# Patient Record
Sex: FEMALE | Race: WHITE | NOT HISPANIC OR LATINO
[De-identification: names, ages, dates, MRNs, and addresses within clinical notes are randomized per-mention and may not be internally consistent; named-entity substitution may affect disease eponyms.]

---

## 2024-02-16 PROBLEM — Z00.00 ENCOUNTER FOR PREVENTIVE HEALTH EXAMINATION: Status: ACTIVE | Noted: 2024-02-16

## 2024-02-20 ENCOUNTER — NON-APPOINTMENT (OUTPATIENT)
Age: 34
End: 2024-02-20

## 2024-02-21 ENCOUNTER — APPOINTMENT (OUTPATIENT)
Dept: OTOLARYNGOLOGY | Facility: CLINIC | Age: 34
End: 2024-02-21
Payer: COMMERCIAL

## 2024-02-21 VITALS
HEART RATE: 78 BPM | DIASTOLIC BLOOD PRESSURE: 80 MMHG | OXYGEN SATURATION: 100 % | WEIGHT: 160 LBS | HEIGHT: 68 IN | SYSTOLIC BLOOD PRESSURE: 116 MMHG | BODY MASS INDEX: 24.25 KG/M2

## 2024-02-21 DIAGNOSIS — Z87.891 PERSONAL HISTORY OF NICOTINE DEPENDENCE: ICD-10-CM

## 2024-02-21 DIAGNOSIS — Z82.49 FAMILY HISTORY OF ISCHEMIC HEART DISEASE AND OTHER DISEASES OF THE CIRCULATORY SYSTEM: ICD-10-CM

## 2024-02-21 DIAGNOSIS — E04.1 NONTOXIC SINGLE THYROID NODULE: ICD-10-CM

## 2024-02-21 DIAGNOSIS — Z80.3 FAMILY HISTORY OF MALIGNANT NEOPLASM OF BREAST: ICD-10-CM

## 2024-02-21 DIAGNOSIS — Z78.9 OTHER SPECIFIED HEALTH STATUS: ICD-10-CM
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
## 2024-02-21 PROCEDURE — 99204 OFFICE O/P NEW MOD 45 MIN: CPT

## 2024-02-21 NOTE — REASON FOR VISIT
[Initial Consultation] : an initial consultation for [Parent] : parent [FreeTextEntry2] :  thyroid nodules

## 2024-02-21 NOTE — PHYSICAL EXAM
[FreeTextEntry1] : there is a rubbery firm L nodule consuming most L lobe.  no plap nodes [Midline] : trachea located in midline position [Normal] : no rashes

## 2024-02-21 NOTE — CONSULT LETTER
[Dear  ___] : Dear  [unfilled], [Consult Letter:] : I had the pleasure of evaluating your patient, [unfilled]. [Please see my note below.] : Please see my note below. [Consult Closing:] : Thank you very much for allowing me to participate in the care of this patient.  If you have any questions, please do not hesitate to contact me. [Sincerely,] : Sincerely, [FreeTextEntry2] : Vipin Romero MD ([place], [place]) [FreeTextEntry3] : Sanju Mena MD, FACS  [facility] Associate Chair Department of Otolaryngology Professor Otolaryngology & Molecular Medicine [facility]

## 2024-02-21 NOTE — HISTORY OF PRESENT ILLNESS
[de-identified] : 33 year old woman referred by Dr. Vipin Romero for thyroid nodules that were first noticed in the beginning of 2/2024. Thyroid Sonography done 2/9/2024 and FNA was just done today done at [facility]. Currently has some swelling and throat pain from the biopsy. Patient was asymptomatic before biopsy. Patient denies globus sensation, dysphagia, odynophagia, dyspnea, dysphonia, hemoptysis or otalgia.  Denies recent fevers/infections, chills, night sweats, weight loss.  Denies use of over the counter antacids or reflux medications.  Former smoker, quit at age 28, smoked 1 ppd for about 4 years. Social alcohol consumption. No family history of thyroid disease or thyroid cancer.  States she has never been on thyroid medication.  no swaloow or resp issues.  pt feels neck/thyroid arae may have been swolloen for a year.

## 2024-02-23 ENCOUNTER — OUTPATIENT (OUTPATIENT)
Dept: OUTPATIENT SERVICES | Facility: HOSPITAL | Age: 34
LOS: 1 days | End: 2024-02-23

## 2024-02-23 ENCOUNTER — APPOINTMENT (OUTPATIENT)
Dept: ULTRASOUND IMAGING | Facility: CLINIC | Age: 34
End: 2024-02-23
Payer: COMMERCIAL

## 2024-02-23 PROCEDURE — 76536 US EXAM OF HEAD AND NECK: CPT | Mod: 26

## 2024-03-04 ENCOUNTER — RESULT REVIEW (OUTPATIENT)
Age: 34
End: 2024-03-04

## 2024-03-04 ENCOUNTER — NON-APPOINTMENT (OUTPATIENT)
Age: 34
End: 2024-03-04

## 2024-03-05 ENCOUNTER — OUTPATIENT (OUTPATIENT)
Dept: OUTPATIENT SERVICES | Facility: HOSPITAL | Age: 34
LOS: 1 days | End: 2024-03-05
Payer: COMMERCIAL

## 2024-03-05 ENCOUNTER — APPOINTMENT (OUTPATIENT)
Dept: ULTRASOUND IMAGING | Facility: IMAGING CENTER | Age: 34
End: 2024-03-05

## 2024-03-05 ENCOUNTER — APPOINTMENT (OUTPATIENT)
Dept: MRI IMAGING | Facility: IMAGING CENTER | Age: 34
End: 2024-03-05
Payer: COMMERCIAL

## 2024-03-05 ENCOUNTER — TRANSCRIPTION ENCOUNTER (OUTPATIENT)
Age: 34
End: 2024-03-05

## 2024-03-05 DIAGNOSIS — C73 MALIGNANT NEOPLASM OF THYROID GLAND: ICD-10-CM

## 2024-03-05 PROCEDURE — 70543 MRI ORBT/FAC/NCK W/O &W/DYE: CPT | Mod: 26

## 2024-03-05 PROCEDURE — 76536 US EXAM OF HEAD AND NECK: CPT

## 2024-03-05 PROCEDURE — 70543 MRI ORBT/FAC/NCK W/O &W/DYE: CPT

## 2024-03-05 PROCEDURE — 76536 US EXAM OF HEAD AND NECK: CPT | Mod: 26

## 2024-03-06 ENCOUNTER — RESULT REVIEW (OUTPATIENT)
Age: 34
End: 2024-03-06

## 2024-03-07 ENCOUNTER — OUTPATIENT (OUTPATIENT)
Dept: OUTPATIENT SERVICES | Facility: HOSPITAL | Age: 34
LOS: 1 days | End: 2024-03-07
Payer: COMMERCIAL

## 2024-03-07 ENCOUNTER — LABORATORY RESULT (OUTPATIENT)
Age: 34
End: 2024-03-07

## 2024-03-07 ENCOUNTER — APPOINTMENT (OUTPATIENT)
Dept: ULTRASOUND IMAGING | Facility: IMAGING CENTER | Age: 34
End: 2024-03-07
Payer: COMMERCIAL

## 2024-03-07 ENCOUNTER — RESULT REVIEW (OUTPATIENT)
Age: 34
End: 2024-03-07

## 2024-03-07 DIAGNOSIS — C73 MALIGNANT NEOPLASM OF THYROID GLAND: ICD-10-CM

## 2024-03-07 PROCEDURE — 88173 CYTOPATH EVAL FNA REPORT: CPT | Mod: 26

## 2024-03-07 PROCEDURE — 88305 TISSUE EXAM BY PATHOLOGIST: CPT | Mod: 26

## 2024-03-07 PROCEDURE — 10005 FNA BX W/US GDN 1ST LES: CPT

## 2024-03-07 PROCEDURE — 88172 CYTP DX EVAL FNA 1ST EA SITE: CPT

## 2024-03-07 PROCEDURE — 88305 TISSUE EXAM BY PATHOLOGIST: CPT

## 2024-03-07 PROCEDURE — 88173 CYTOPATH EVAL FNA REPORT: CPT

## 2024-03-08 LAB
T3FREE SERPL-MCNC: 3.54 PG/ML
T4 FREE SERPL-MCNC: 1.4 NG/DL
THYROGLOB AB SERPL-ACNC: <20 IU/ML
THYROGLOB SERPL-MCNC: 913 NG/ML
TSH SERPL-ACNC: 1.71 UIU/ML

## 2024-03-12 ENCOUNTER — OUTPATIENT (OUTPATIENT)
Dept: OUTPATIENT SERVICES | Facility: HOSPITAL | Age: 34
LOS: 1 days | End: 2024-03-12

## 2024-03-12 VITALS
HEIGHT: 68 IN | SYSTOLIC BLOOD PRESSURE: 126 MMHG | DIASTOLIC BLOOD PRESSURE: 86 MMHG | OXYGEN SATURATION: 99 % | TEMPERATURE: 98 F | HEART RATE: 62 BPM | RESPIRATION RATE: 18 BRPM | WEIGHT: 184.09 LBS

## 2024-03-12 DIAGNOSIS — S61.219A LACERATION WITHOUT FOREIGN BODY OF UNSPECIFIED FINGER WITHOUT DAMAGE TO NAIL, INITIAL ENCOUNTER: Chronic | ICD-10-CM

## 2024-03-12 DIAGNOSIS — E04.1 NONTOXIC SINGLE THYROID NODULE: ICD-10-CM

## 2024-03-12 LAB — HCG UR QL: NEGATIVE — SIGNIFICANT CHANGE UP

## 2024-03-12 NOTE — H&P PST ADULT - NSICDXFAMILYHX_GEN_ALL_CORE_FT
FAMILY HISTORY:  Father  Still living? Unknown  FH: myocardial infarction, Age at diagnosis: Age Unknown    Grandparent  Still living? Unknown  FH: breast cancer, Age at diagnosis: Age Unknown

## 2024-03-12 NOTE — H&P PST ADULT - CIGARETTES, PACKS/DAY
Medical Necessity Information: It is in your best interest to select a reason for this procedure from the list below. All of these items fulfill various CMS LCD requirements except lesion extends to a margin. 0.5

## 2024-03-12 NOTE — H&P PST ADULT - PROBLEM SELECTOR PLAN 1
Patient tentatively scheduled for Left Thyroid Lobectomy on 3/18/24.  Pre-op instructions provided. Pt given verbal and written instructions with teach back on chlorhexidine shampoo and pepcid. Pt verbalized understanding with return demonstration.     [facility] was done at [facility].  Copy of recent labs CBC, BMP requested from PCP.

## 2024-03-12 NOTE — H&P PST ADULT - NEGATIVE ENMT SYMPTOMS
Denies dentures. Denies loose teeth./no hearing difficulty/no ear pain/no tinnitus/no vertigo/no sinus symptoms/no abnormal taste sensation/no dry mouth/no throat pain/no dysphagia

## 2024-03-12 NOTE — H&P PST ADULT - NEGATIVE NEUROLOGICAL SYMPTOMS
no transient paralysis/no weakness/no paresthesias/no generalized seizures/no focal seizures/no syncope/no vertigo/no loss of sensation/no difficulty walking/no headache/no confusion/no facial palsy

## 2024-03-12 NOTE — H&P PST ADULT - NSICDXPASTMEDICALHX_GEN_ALL_CORE_FT
PAST MEDICAL HISTORY:  Nontoxic single thyroid nodule     Seasonal allergies      PAST MEDICAL HISTORY:  Anxiety and depression     Nontoxic single thyroid nodule     Seasonal allergies

## 2024-03-12 NOTE — H&P PST ADULT - HISTORY OF PRESENT ILLNESS
33 year old female with pmhx of Seasonal allergies, presents for pre-op evaluation for diagnosis of Nontoxic single thyroid nodule. Patient is scheduled for Left Thyroid Lobectomy. Patient was found to have Left thyroid nodule 2/2024, s/p biopsy which showed Malignant Papillary Thyroid Carcinoma. Denies throat pain, hoarseness, dysphagia, fever, sob, cp, peck, n/v/d/c.

## 2024-03-14 LAB — NON-GYNECOLOGICAL CYTOLOGY STUDY: SIGNIFICANT CHANGE UP

## 2024-03-15 PROBLEM — F41.9 ANXIETY DISORDER, UNSPECIFIED: Chronic | Status: ACTIVE | Noted: 2024-03-12

## 2024-03-15 NOTE — ASU PATIENT PROFILE, ADULT - FALL HARM RISK - UNIVERSAL INTERVENTIONS
Bed in lowest position, wheels locked, appropriate side rails in place/Call bell, personal items and telephone in reach/Instruct patient to call for assistance before getting out of bed or chair/Non-slip footwear when patient is out of bed/[place] to call system/Physically safe environment - no spills, clutter or unnecessary equipment/Purposeful Proactive Rounding/Room/bathroom lighting operational, light cord in reach

## 2024-03-15 NOTE — ASU PATIENT PROFILE, ADULT - NSICDXPASTMEDICALHX_GEN_ALL_CORE_FT
PAST MEDICAL HISTORY:  Anxiety and depression     Nontoxic single thyroid nodule     Seasonal allergies

## 2024-03-16 PROBLEM — J30.2 OTHER SEASONAL ALLERGIC RHINITIS: Chronic | Status: ACTIVE | Noted: 2024-03-12

## 2024-03-16 PROBLEM — E04.1 NONTOXIC SINGLE THYROID NODULE: Chronic | Status: ACTIVE | Noted: 2024-03-12

## 2024-03-17 ENCOUNTER — TRANSCRIPTION ENCOUNTER (OUTPATIENT)
Age: 34
End: 2024-03-17

## 2024-03-18 ENCOUNTER — INPATIENT (INPATIENT)
Facility: HOSPITAL | Age: 34
LOS: 1 days | Discharge: HOME | End: 2024-03-20
Attending: OTOLARYNGOLOGY | Admitting: OTOLARYNGOLOGY
Payer: COMMERCIAL

## 2024-03-18 ENCOUNTER — RESULT REVIEW (OUTPATIENT)
Age: 34
End: 2024-03-18

## 2024-03-18 ENCOUNTER — TRANSCRIPTION ENCOUNTER (OUTPATIENT)
Age: 34
End: 2024-03-18

## 2024-03-18 ENCOUNTER — APPOINTMENT (OUTPATIENT)
Dept: OTOLARYNGOLOGY | Facility: HOSPITAL | Age: 34
End: 2024-03-18

## 2024-03-18 VITALS
DIASTOLIC BLOOD PRESSURE: 85 MMHG | HEART RATE: 70 BPM | SYSTOLIC BLOOD PRESSURE: 118 MMHG | TEMPERATURE: 98 F | WEIGHT: 184.09 LBS | HEIGHT: 68 IN | RESPIRATION RATE: 18 BRPM | OXYGEN SATURATION: 98 %

## 2024-03-18 DIAGNOSIS — S61.219A LACERATION WITHOUT FOREIGN BODY OF UNSPECIFIED FINGER WITHOUT DAMAGE TO NAIL, INITIAL ENCOUNTER: Chronic | ICD-10-CM

## 2024-03-18 DIAGNOSIS — C73 MALIGNANT NEOPLASM OF THYROID GLAND: ICD-10-CM

## 2024-03-18 LAB
CALCIUM SERPL-MCNC: 8.3 MG/DL — LOW (ref 8.4–10.5)
HCG UR QL: NEGATIVE — SIGNIFICANT CHANGE UP
PTH-INTACT FLD-MCNC: 25 PG/ML — SIGNIFICANT CHANGE UP (ref 15–65)

## 2024-03-18 PROCEDURE — 88307 TISSUE EXAM BY PATHOLOGIST: CPT | Mod: 26

## 2024-03-18 PROCEDURE — 60252 REMOVAL OF THYROID: CPT | Mod: GC,RT

## 2024-03-18 PROCEDURE — 38724 REMOVAL OF LYMPH NODES NECK: CPT | Mod: GC,LT

## 2024-03-18 DEVICE — CARTRIDGE MICROCLIP 30: Type: IMPLANTABLE DEVICE | Status: FUNCTIONAL

## 2024-03-18 DEVICE — TUBE EMG NIM TRIVANTAGE 7MM: Type: IMPLANTABLE DEVICE | Status: FUNCTIONAL

## 2024-03-18 DEVICE — SURGICEL 2 X 14": Type: IMPLANTABLE DEVICE | Status: FUNCTIONAL

## 2024-03-18 DEVICE — LIGATING CLIPS WECK HORIZON SMALL-WIDE (RED) 24: Type: IMPLANTABLE DEVICE | Status: FUNCTIONAL

## 2024-03-18 DEVICE — LIGATING CLIPS WECK HORIZON MEDIUM (BLUE) 24: Type: IMPLANTABLE DEVICE | Status: FUNCTIONAL

## 2024-03-18 RX ORDER — HYDROXYZINE HCL 10 MG
1 TABLET ORAL
Refills: 0 | DISCHARGE

## 2024-03-18 RX ORDER — OXYCODONE HYDROCHLORIDE 5 MG/1
7.5 TABLET ORAL EVERY 4 HOURS
Refills: 0 | Status: DISCONTINUED | OUTPATIENT
Start: 2024-03-18 | End: 2024-03-20

## 2024-03-18 RX ORDER — FENTANYL CITRATE 50 UG/ML
50 INJECTION INTRAVENOUS
Refills: 0 | Status: DISCONTINUED | OUTPATIENT
Start: 2024-03-18 | End: 2024-03-18

## 2024-03-18 RX ORDER — CHOLECALCIFEROL (VITAMIN D3) 125 MCG
0 CAPSULE ORAL
Refills: 0 | DISCHARGE

## 2024-03-18 RX ORDER — FAMOTIDINE 10 MG/ML
20 INJECTION INTRAVENOUS DAILY
Refills: 0 | Status: DISCONTINUED | OUTPATIENT
Start: 2024-03-18 | End: 2024-03-20

## 2024-03-18 RX ORDER — BENZOCAINE AND MENTHOL 5; 1 G/100ML; G/100ML
2 LIQUID ORAL EVERY 4 HOURS
Refills: 0 | Status: DISCONTINUED | OUTPATIENT
Start: 2024-03-18 | End: 2024-03-20

## 2024-03-18 RX ORDER — LEVOTHYROXINE SODIUM 125 MCG
137 TABLET ORAL DAILY
Refills: 0 | Status: DISCONTINUED | OUTPATIENT
Start: 2024-03-19 | End: 2024-03-20

## 2024-03-18 RX ORDER — OXYCODONE HYDROCHLORIDE 5 MG/1
5 TABLET ORAL EVERY 4 HOURS
Refills: 0 | Status: DISCONTINUED | OUTPATIENT
Start: 2024-03-18 | End: 2024-03-20

## 2024-03-18 RX ORDER — CALCIUM CARBONATE 500(1250)
1 TABLET ORAL EVERY 8 HOURS
Refills: 0 | Status: DISCONTINUED | OUTPATIENT
Start: 2024-03-18 | End: 2024-03-20

## 2024-03-18 RX ORDER — FLUOXETINE HCL 10 MG
1 CAPSULE ORAL
Refills: 0 | DISCHARGE

## 2024-03-18 RX ORDER — ONDANSETRON 8 MG/1
4 TABLET, FILM COATED ORAL ONCE
Refills: 0 | Status: DISCONTINUED | OUTPATIENT
Start: 2024-03-18 | End: 2024-03-20

## 2024-03-18 RX ORDER — ACETAMINOPHEN 500 MG
650 TABLET ORAL EVERY 6 HOURS
Refills: 0 | Status: DISCONTINUED | OUTPATIENT
Start: 2024-03-18 | End: 2024-03-20

## 2024-03-18 RX ORDER — CALCITRIOL 0.5 UG/1
0.25 CAPSULE ORAL
Refills: 0 | Status: DISCONTINUED | OUTPATIENT
Start: 2024-03-18 | End: 2024-03-20

## 2024-03-18 RX ADMIN — FAMOTIDINE 20 MILLIGRAM(S): 10 INJECTION INTRAVENOUS at 23:42

## 2024-03-18 RX ADMIN — Medication 1 TABLET(S): at 23:41

## 2024-03-18 RX ADMIN — OXYCODONE HYDROCHLORIDE 7.5 MILLIGRAM(S): 5 TABLET ORAL at 22:42

## 2024-03-18 RX ADMIN — OXYCODONE HYDROCHLORIDE 7.5 MILLIGRAM(S): 5 TABLET ORAL at 23:12

## 2024-03-18 RX ADMIN — CALCITRIOL 0.25 MICROGRAM(S): 0.5 CAPSULE ORAL at 18:55

## 2024-03-18 RX ADMIN — BENZOCAINE AND MENTHOL 2 LOZENGE: 5; 1 LIQUID ORAL at 23:42

## 2024-03-18 NOTE — PATIENT PROFILE ADULT - FALL HARM RISK - HARM RISK INTERVENTIONS

## 2024-03-18 NOTE — ASU PREOP CHECKLIST - STERILIZATION AFFIRMATION
[Normal Appearance] : normal appearance [General Appearance - Well Developed] : well developed [Well Groomed] : well groomed [General Appearance - Well Nourished] : well nourished [No Deformities] : no deformities [Normal Conjunctiva] : the conjunctiva exhibited no abnormalities [General Appearance - In No Acute Distress] : no acute distress [Eyelids - No Xanthelasma] : the eyelids demonstrated no xanthelasmas [Normal Oral Mucosa] : normal oral mucosa [No Oral Pallor] : no oral pallor [No Oral Cyanosis] : no oral cyanosis [Normal Jugular Venous A Waves Present] : normal jugular venous A waves present [Normal Jugular Venous V Waves Present] : normal jugular venous V waves present [No Jugular Venous Alvarez A Waves] : no jugular venous alvarez A waves [FreeTextEntry1] : decr upstroke [Respiration, Rhythm And Depth] : normal respiratory rhythm and effort [Auscultation Breath Sounds / Voice Sounds] : lungs were clear to auscultation bilaterally [Exaggerated Use Of Accessory Muscles For Inspiration] : no accessory muscle use [Heart Rate And Rhythm] : heart rate and rhythm were normal [Heart Sounds] : normal S1 and S2 [Murmurs] : no murmurs present [Abdomen Tenderness] : non-tender [Abdomen Soft] : soft [Abdomen Mass (___ Cm)] : no abdominal mass palpated [Gait - Sufficient For Exercise Testing] : the gait was sufficient for exercise testing [Abnormal Walk] : normal gait [Nail Clubbing] : no clubbing of the fingernails [Cyanosis, Localized] : no localized cyanosis [Petechial Hemorrhages (___cm)] : no petechial hemorrhages [Skin Color & Pigmentation] : normal skin color and pigmentation [] : no rash [No Venous Stasis] : no venous stasis [Skin Lesions] : no skin lesions [No Skin Ulcers] : no skin ulcer [No Xanthoma] : no  xanthoma was observed [Oriented To Time, Place, And Person] : oriented to person, place, and time n/a [Affect] : the affect was normal [No Anxiety] : not feeling anxious [Mood] : the mood was normal

## 2024-03-18 NOTE — BRIEF OPERATIVE NOTE - SPECIMENS
thyroidectomy, levels 2a-4, central neck dissection  thyroidectomy, levels 2a-4, central neck dissection  total thyroidectomy, central neck dissection, left selective neck dissection 2a-4

## 2024-03-18 NOTE — BRIEF OPERATIVE NOTE - NSICDXBRIEFPROCEDURE_GEN_ALL_CORE_FT
PROCEDURES:  Total thyroidectomy 18-Mar-2024 17:21:12  Alyse Rosado  Left selective neck dissection 18-Mar-2024 17:21:28  Alyse Rosado

## 2024-03-18 NOTE — BRIEF OPERATIVE NOTE - OPERATION/FINDINGS
Total thyroidectomy with bilateral central neck dissection and left selective neck dissection including levels 2a-4. Intraop chyle leak noted on left side, controlled.

## 2024-03-19 LAB
ADD ON TEST-SPECIMEN IN LAB: SIGNIFICANT CHANGE UP
ADD ON TEST-SPECIMEN IN LAB: SIGNIFICANT CHANGE UP
ALBUMIN SERPL ELPH-MCNC: 3.7 G/DL — SIGNIFICANT CHANGE UP (ref 3.3–5)
ALP SERPL-CCNC: 53 U/L — SIGNIFICANT CHANGE UP (ref 40–120)
ALT FLD-CCNC: 14 U/L — SIGNIFICANT CHANGE UP (ref 4–33)
ANION GAP SERPL CALC-SCNC: 12 MMOL/L — SIGNIFICANT CHANGE UP (ref 7–14)
AST SERPL-CCNC: 17 U/L — SIGNIFICANT CHANGE UP (ref 4–32)
BILIRUB SERPL-MCNC: 0.7 MG/DL — SIGNIFICANT CHANGE UP (ref 0.2–1.2)
BUN SERPL-MCNC: 12 MG/DL — SIGNIFICANT CHANGE UP (ref 7–23)
CALCIUM SERPL-MCNC: 7.5 MG/DL — LOW (ref 8.4–10.5)
CALCIUM SERPL-MCNC: 7.9 MG/DL — LOW (ref 8.4–10.5)
CALCIUM SERPL-MCNC: 8.3 MG/DL — LOW (ref 8.4–10.5)
CHLORIDE SERPL-SCNC: 101 MMOL/L — SIGNIFICANT CHANGE UP (ref 98–107)
CO2 SERPL-SCNC: 23 MMOL/L — SIGNIFICANT CHANGE UP (ref 22–31)
CREAT SERPL-MCNC: 0.77 MG/DL — SIGNIFICANT CHANGE UP (ref 0.5–1.3)
EGFR: 106 ML/MIN/1.73M2 — SIGNIFICANT CHANGE UP
GLUCOSE SERPL-MCNC: 131 MG/DL — HIGH (ref 70–99)
HCT VFR BLD CALC: 39.1 % — SIGNIFICANT CHANGE UP (ref 34.5–45)
HGB BLD-MCNC: 13.5 G/DL — SIGNIFICANT CHANGE UP (ref 11.5–15.5)
MCHC RBC-ENTMCNC: 28.9 PG — SIGNIFICANT CHANGE UP (ref 27–34)
MCHC RBC-ENTMCNC: 34.5 GM/DL — SIGNIFICANT CHANGE UP (ref 32–36)
MCV RBC AUTO: 83.7 FL — SIGNIFICANT CHANGE UP (ref 80–100)
NRBC # BLD: 0 /100 WBCS — SIGNIFICANT CHANGE UP (ref 0–0)
NRBC # FLD: 0 K/UL — SIGNIFICANT CHANGE UP (ref 0–0)
PLATELET # BLD AUTO: 256 K/UL — SIGNIFICANT CHANGE UP (ref 150–400)
POTASSIUM SERPL-MCNC: 4.1 MMOL/L — SIGNIFICANT CHANGE UP (ref 3.5–5.3)
POTASSIUM SERPL-SCNC: 4.1 MMOL/L — SIGNIFICANT CHANGE UP (ref 3.5–5.3)
PROT SERPL-MCNC: 6.4 G/DL — SIGNIFICANT CHANGE UP (ref 6–8.3)
PTH-INTACT FLD-MCNC: 20 PG/ML — SIGNIFICANT CHANGE UP (ref 15–65)
RBC # BLD: 4.67 M/UL — SIGNIFICANT CHANGE UP (ref 3.8–5.2)
RBC # FLD: 12.2 % — SIGNIFICANT CHANGE UP (ref 10.3–14.5)
SODIUM SERPL-SCNC: 136 MMOL/L — SIGNIFICANT CHANGE UP (ref 135–145)
WBC # BLD: 10.17 K/UL — SIGNIFICANT CHANGE UP (ref 3.8–10.5)
WBC # FLD AUTO: 10.17 K/UL — SIGNIFICANT CHANGE UP (ref 3.8–10.5)

## 2024-03-19 RX ORDER — CALCITRIOL 0.5 UG/1
1 CAPSULE ORAL
Qty: 60 | Refills: 0
Start: 2024-03-19 | End: 2024-04-17

## 2024-03-19 RX ORDER — INFLUENZA VIRUS VACCINE 15; 15; 15; 15 UG/.5ML; UG/.5ML; UG/.5ML; UG/.5ML
0.5 SUSPENSION INTRAMUSCULAR ONCE
Refills: 0 | Status: DISCONTINUED | OUTPATIENT
Start: 2024-03-19 | End: 2024-03-20

## 2024-03-19 RX ORDER — LEVOTHYROXINE SODIUM 125 MCG
1 TABLET ORAL
Qty: 30 | Refills: 0
Start: 2024-03-19 | End: 2024-04-17

## 2024-03-19 RX ORDER — CALCIUM CARBONATE 500(1250)
1 TABLET ORAL
Qty: 90 | Refills: 0
Start: 2024-03-19 | End: 2024-04-17

## 2024-03-19 RX ORDER — OXYCODONE HYDROCHLORIDE 5 MG/1
1 TABLET ORAL
Qty: 24 | Refills: 0
Start: 2024-03-19 | End: 2024-03-22

## 2024-03-19 RX ORDER — POLYETHYLENE GLYCOL 3350 17 G/17G
17 POWDER, FOR SOLUTION ORAL DAILY
Refills: 0 | Status: DISCONTINUED | OUTPATIENT
Start: 2024-03-19 | End: 2024-03-20

## 2024-03-19 RX ADMIN — OXYCODONE HYDROCHLORIDE 5 MILLIGRAM(S): 5 TABLET ORAL at 20:47

## 2024-03-19 RX ADMIN — Medication 1 TABLET(S): at 06:50

## 2024-03-19 RX ADMIN — BENZOCAINE AND MENTHOL 2 LOZENGE: 5; 1 LIQUID ORAL at 18:19

## 2024-03-19 RX ADMIN — Medication 1 TABLET(S): at 13:16

## 2024-03-19 RX ADMIN — OXYCODONE HYDROCHLORIDE 7.5 MILLIGRAM(S): 5 TABLET ORAL at 10:28

## 2024-03-19 RX ADMIN — Medication 137 MICROGRAM(S): at 05:44

## 2024-03-19 RX ADMIN — OXYCODONE HYDROCHLORIDE 7.5 MILLIGRAM(S): 5 TABLET ORAL at 09:58

## 2024-03-19 RX ADMIN — OXYCODONE HYDROCHLORIDE 7.5 MILLIGRAM(S): 5 TABLET ORAL at 18:49

## 2024-03-19 RX ADMIN — OXYCODONE HYDROCHLORIDE 7.5 MILLIGRAM(S): 5 TABLET ORAL at 14:00

## 2024-03-19 RX ADMIN — Medication 1 TABLET(S): at 22:03

## 2024-03-19 RX ADMIN — CALCITRIOL 0.25 MICROGRAM(S): 0.5 CAPSULE ORAL at 06:50

## 2024-03-19 RX ADMIN — POLYETHYLENE GLYCOL 3350 17 GRAM(S): 17 POWDER, FOR SOLUTION ORAL at 16:05

## 2024-03-19 RX ADMIN — OXYCODONE HYDROCHLORIDE 7.5 MILLIGRAM(S): 5 TABLET ORAL at 14:30

## 2024-03-19 RX ADMIN — CALCITRIOL 0.25 MICROGRAM(S): 0.5 CAPSULE ORAL at 18:21

## 2024-03-19 RX ADMIN — OXYCODONE HYDROCHLORIDE 7.5 MILLIGRAM(S): 5 TABLET ORAL at 04:04

## 2024-03-19 RX ADMIN — OXYCODONE HYDROCHLORIDE 7.5 MILLIGRAM(S): 5 TABLET ORAL at 18:19

## 2024-03-19 RX ADMIN — BENZOCAINE AND MENTHOL 2 LOZENGE: 5; 1 LIQUID ORAL at 22:03

## 2024-03-19 RX ADMIN — OXYCODONE HYDROCHLORIDE 7.5 MILLIGRAM(S): 5 TABLET ORAL at 23:16

## 2024-03-19 RX ADMIN — OXYCODONE HYDROCHLORIDE 7.5 MILLIGRAM(S): 5 TABLET ORAL at 04:34

## 2024-03-19 RX ADMIN — BENZOCAINE AND MENTHOL 2 LOZENGE: 5; 1 LIQUID ORAL at 09:59

## 2024-03-19 RX ADMIN — OXYCODONE HYDROCHLORIDE 7.5 MILLIGRAM(S): 5 TABLET ORAL at 23:46

## 2024-03-19 RX ADMIN — BENZOCAINE AND MENTHOL 2 LOZENGE: 5; 1 LIQUID ORAL at 13:16

## 2024-03-19 RX ADMIN — FAMOTIDINE 20 MILLIGRAM(S): 10 INJECTION INTRAVENOUS at 11:39

## 2024-03-19 RX ADMIN — OXYCODONE HYDROCHLORIDE 5 MILLIGRAM(S): 5 TABLET ORAL at 20:17

## 2024-03-19 NOTE — DISCHARGE NOTE PROVIDER - NSDCFUSCHEDAPPT_GEN_ALL_CORE_FT
[facility]  OTOLARYNG [address] [facility]  Scheduled Appointment: 03/27/2024    [facility]  ENDOCRIN [address] [place] [address]  Scheduled Appointment: 04/17/2024

## 2024-03-19 NOTE — DISCHARGE NOTE PROVIDER - NSDCMRMEDTOKEN_GEN_ALL_CORE_FT
FLUoxetine 10 mg oral tablet: 1 tab(s) orally once a day (in the morning)  hydrOXYzine hydrochloride 25 mg oral tablet: 1 tab(s) orally once a day (at bedtime)  Vitamin C with rosehips: 1000 mg orally occasionally LD 3/11/2024  Vitamin D3: 125 mcg orally once a day   calcitriol 0.25 mcg oral capsule: 1 cap(s) orally 2 times a day  calcium carbonate 1250 mg (500 mg elemental calcium) oral tablet: 1 tab(s) orally every 8 hours  FLUoxetine 10 mg oral tablet: 1 tab(s) orally once a day (in the morning)  hydrOXYzine hydrochloride 25 mg oral tablet: 1 tab(s) orally once a day (at bedtime)  levothyroxine 137 mcg (0.137 mg) oral tablet: 1 tab(s) orally once a day  oxyCODONE 5 mg oral tablet: 1 tab(s) orally every 4 hours as needed for Moderate Pain (4 - 6) MDD: 6  Vitamin C with rosehips: 1000 mg orally occasionally LD 3/11/2024  Vitamin D3: 125 mcg orally once a day   calcitriol 0.25 mcg oral capsule: 1 cap(s) orally 2 times a day  calcitriol 0.25 mcg oral capsule: 1 cap(s) orally 2 times a day  calcium carbonate 1250 mg (500 mg elemental calcium) oral tablet: 1 tab(s) orally every 8 hours  FLUoxetine 10 mg oral tablet: 1 tab(s) orally once a day (in the morning)  hydrOXYzine hydrochloride 25 mg oral tablet: 1 tab(s) orally once a day (at bedtime)  levothyroxine 137 mcg (0.137 mg) oral tablet: 1 tab(s) orally once a day  oxyCODONE 5 mg oral tablet: 1 tab(s) orally every 4 hours as needed for Moderate Pain (4 - 6) MDD: 6  Vitamin C with rosehips: 1000 mg orally occasionally LD 3/11/2024  Vitamin D3: 125 mcg orally once a day

## 2024-03-19 NOTE — DISCHARGE NOTE PROVIDER - CARE PROVIDER_API CALL
Sanju Mena  Head/Neck Surgery  [address] [place] [address]  Phone: [phone]  Fax: [phone]  Follow Up Time: 1 week

## 2024-03-19 NOTE — DISCHARGE NOTE PROVIDER - NSDCFUADDINST_GEN_ALL_CORE_FT
Surgical instructions:   - Please take calcium medications as directed  - Rocal and calcium carbonate   - Please take synthroid (thyroid hormone replacement) every single day in the AM before taking any other food or medications (for at least 1 hour)   - Please monitor for signs of low calcium - numbness/tingling (around lips, finger tips/toes) muscle spasms.    Incision site:      Steri strips (white band aids) - please keep these dry for 48 hours after surgery    - after 48 hours you may shower    - the steri strips may come off on their own - do not scrub let the water wash over incision site  Surgical instructions:   - Please take calcium medications as directed  - Rocal and calcium carbonate   - Please take synthroid (thyroid hormone replacement) every single day in the AM before taking any other food or medications (for at least 1 hour)   - Please monitor for signs of low calcium - numbness/tingling (around lips, finger tips/toes) muscle spasms.    Incision site:      Steri strips (white band aids) - please keep these dry for 48 hours after surgery    - after 48 hours you may shower    - the steri strips may come off on their own - do not scrub let the water wash over incision site     Empty CYNDY drain as taught at [facility]. Follow up with Dr. Edge office for removal on Thursday if the drain has less then 30cc of output over 24 hours. If the drain color is white with  thick milky apperance you should call Dr. Mena's office and let him know.  Surgical instructions:   - Please take calcium medications as directed  - Rocal and calcium carbonate   - Please take synthroid (thyroid hormone replacement) every single day in the AM before taking any other food or medications (for at least 1 hour)   - Please monitor for signs of low calcium - numbness/tingling (around lips, finger tips/toes) muscle spasms.    Incision site:      Steri strips (white band aids) - please keep these dry for 48 hours after surgery    - after 48 hours you may shower    - the steri strips may come off on their own - do not scrub let the water wash over incision site

## 2024-03-19 NOTE — DISCHARGE NOTE PROVIDER - NSDCCPCAREPLAN_GEN_ALL_CORE_FT
PRINCIPAL DISCHARGE DIAGNOSIS  Diagnosis: Malignant neoplasm of thyroid gland  Assessment and Plan of Treatment: Follow up outpatient

## 2024-03-19 NOTE — DISCHARGE NOTE PROVIDER - HOSPITAL COURSE
33F w/ PTC s/p TT, BL CND, L SND 2a-4, c/b chyle leak introp 3/18 One CYNDY drain placed intraopt. Patient transferred from PACU to 8s. Calcium and PTH levels monitored and trended. Calcium 8.3____ and PTH 25. Patient placed on rocal and calcium carbonate. Synthroid started 33F w/ PTC s/p TT, BL CND, L SND 2a-4, c/b chyle leak introp 3/18 One CYNDY drain placed intraopt. Patient transferred from PACU to 8s. Calcium and PTH levels monitored and trended. and found to be stable Patient was taught CYNDY care.  Patient placed on rocal and calcium carbonate. Synthroid started . Patient was cleared for discharge with CYNDY drain on 3/19/24. 33F w/ PTC s/p TT, BL CND, L SND 2a-4, c/b chyle leak introp 3/18 One CYNDY drain placed intraopt. Patient transferred from PACU to 8s. Calcium and PTH levels monitored and trended. and found to be stable Patient was taught CYNDY care.  Patient placed on rocal and calcium carbonate. Synthroid started . Patient was cleared for discharge on 3/20/24.

## 2024-03-20 ENCOUNTER — TRANSCRIPTION ENCOUNTER (OUTPATIENT)
Age: 34
End: 2024-03-20

## 2024-03-20 VITALS
HEART RATE: 94 BPM | DIASTOLIC BLOOD PRESSURE: 80 MMHG | RESPIRATION RATE: 18 BRPM | OXYGEN SATURATION: 96 % | TEMPERATURE: 99 F | SYSTOLIC BLOOD PRESSURE: 119 MMHG

## 2024-03-20 LAB
ADD ON TEST-SPECIMEN IN LAB: SIGNIFICANT CHANGE UP
ALBUMIN SERPL ELPH-MCNC: 3.4 G/DL — SIGNIFICANT CHANGE UP (ref 3.3–5)
ALP SERPL-CCNC: 48 U/L — SIGNIFICANT CHANGE UP (ref 40–120)
ALT FLD-CCNC: 12 U/L — SIGNIFICANT CHANGE UP (ref 4–33)
ANION GAP SERPL CALC-SCNC: 11 MMOL/L — SIGNIFICANT CHANGE UP (ref 7–14)
AST SERPL-CCNC: 15 U/L — SIGNIFICANT CHANGE UP (ref 4–32)
BILIRUB SERPL-MCNC: 0.6 MG/DL — SIGNIFICANT CHANGE UP (ref 0.2–1.2)
BUN SERPL-MCNC: 11 MG/DL — SIGNIFICANT CHANGE UP (ref 7–23)
CALCIUM SERPL-MCNC: 7.4 MG/DL — LOW (ref 8.4–10.5)
CHLORIDE SERPL-SCNC: 97 MMOL/L — LOW (ref 98–107)
CO2 SERPL-SCNC: 24 MMOL/L — SIGNIFICANT CHANGE UP (ref 22–31)
CREAT SERPL-MCNC: 0.85 MG/DL — SIGNIFICANT CHANGE UP (ref 0.5–1.3)
EGFR: 94 ML/MIN/1.73M2 — SIGNIFICANT CHANGE UP
GLUCOSE SERPL-MCNC: 118 MG/DL — HIGH (ref 70–99)
HCT VFR BLD CALC: 38.1 % — SIGNIFICANT CHANGE UP (ref 34.5–45)
HGB BLD-MCNC: 13.2 G/DL — SIGNIFICANT CHANGE UP (ref 11.5–15.5)
MAGNESIUM SERPL-MCNC: 1.7 MG/DL — SIGNIFICANT CHANGE UP (ref 1.6–2.6)
MAGNESIUM SERPL-MCNC: 1.9 MG/DL — SIGNIFICANT CHANGE UP (ref 1.6–2.6)
MCHC RBC-ENTMCNC: 29.6 PG — SIGNIFICANT CHANGE UP (ref 27–34)
MCHC RBC-ENTMCNC: 34.6 GM/DL — SIGNIFICANT CHANGE UP (ref 32–36)
MCV RBC AUTO: 85.4 FL — SIGNIFICANT CHANGE UP (ref 80–100)
NRBC # BLD: 0 /100 WBCS — SIGNIFICANT CHANGE UP (ref 0–0)
NRBC # FLD: 0 K/UL — SIGNIFICANT CHANGE UP (ref 0–0)
PHOSPHATE SERPL-MCNC: 3.7 MG/DL — SIGNIFICANT CHANGE UP (ref 2.5–4.5)
PHOSPHATE SERPL-MCNC: 3.7 MG/DL — SIGNIFICANT CHANGE UP (ref 2.5–4.5)
PLATELET # BLD AUTO: 217 K/UL — SIGNIFICANT CHANGE UP (ref 150–400)
POTASSIUM SERPL-MCNC: 3.9 MMOL/L — SIGNIFICANT CHANGE UP (ref 3.5–5.3)
POTASSIUM SERPL-SCNC: 3.9 MMOL/L — SIGNIFICANT CHANGE UP (ref 3.5–5.3)
PROT SERPL-MCNC: 6 G/DL — SIGNIFICANT CHANGE UP (ref 6–8.3)
RBC # BLD: 4.46 M/UL — SIGNIFICANT CHANGE UP (ref 3.8–5.2)
RBC # FLD: 12.2 % — SIGNIFICANT CHANGE UP (ref 10.3–14.5)
SODIUM SERPL-SCNC: 132 MMOL/L — LOW (ref 135–145)
WBC # BLD: 11.67 K/UL — HIGH (ref 3.8–10.5)
WBC # FLD AUTO: 11.67 K/UL — HIGH (ref 3.8–10.5)

## 2024-03-20 RX ORDER — POLYETHYLENE GLYCOL 3350 17 G/17G
17 POWDER, FOR SOLUTION ORAL DAILY
Refills: 0 | Status: DISCONTINUED | OUTPATIENT
Start: 2024-03-20 | End: 2024-03-20

## 2024-03-20 RX ORDER — MAGNESIUM OXIDE 400 MG ORAL TABLET 241.3 MG
400 TABLET ORAL ONCE
Refills: 0 | Status: COMPLETED | OUTPATIENT
Start: 2024-03-20 | End: 2024-03-20

## 2024-03-20 RX ORDER — MAGNESIUM OXIDE 400 MG ORAL TABLET 241.3 MG
400 TABLET ORAL ONCE
Refills: 0 | Status: DISCONTINUED | OUTPATIENT
Start: 2024-03-20 | End: 2024-03-20

## 2024-03-20 RX ORDER — CALCITRIOL 0.5 UG/1
1 CAPSULE ORAL
Qty: 60 | Refills: 0
Start: 2024-03-20 | End: 2024-04-18

## 2024-03-20 RX ORDER — CALCIUM CARBONATE 500(1250)
1 TABLET ORAL
Qty: 90 | Refills: 0
Start: 2024-03-20 | End: 2024-04-18

## 2024-03-20 RX ORDER — LEVOTHYROXINE SODIUM 125 MCG
1 TABLET ORAL
Qty: 30 | Refills: 0
Start: 2024-03-20 | End: 2024-04-18

## 2024-03-20 RX ORDER — OXYCODONE HYDROCHLORIDE 5 MG/1
1 TABLET ORAL
Qty: 24 | Refills: 0
Start: 2024-03-20 | End: 2024-03-23

## 2024-03-20 RX ORDER — SENNA PLUS 8.6 MG/1
2 TABLET ORAL AT BEDTIME
Refills: 0 | Status: DISCONTINUED | OUTPATIENT
Start: 2024-03-20 | End: 2024-03-20

## 2024-03-20 RX ADMIN — CALCITRIOL 0.25 MICROGRAM(S): 0.5 CAPSULE ORAL at 05:25

## 2024-03-20 RX ADMIN — OXYCODONE HYDROCHLORIDE 7.5 MILLIGRAM(S): 5 TABLET ORAL at 11:12

## 2024-03-20 RX ADMIN — Medication 650 MILLIGRAM(S): at 09:40

## 2024-03-20 RX ADMIN — BENZOCAINE AND MENTHOL 2 LOZENGE: 5; 1 LIQUID ORAL at 09:03

## 2024-03-20 RX ADMIN — OXYCODONE HYDROCHLORIDE 5 MILLIGRAM(S): 5 TABLET ORAL at 05:27

## 2024-03-20 RX ADMIN — MAGNESIUM OXIDE 400 MG ORAL TABLET 400 MILLIGRAM(S): 241.3 TABLET ORAL at 07:37

## 2024-03-20 RX ADMIN — Medication 1 TABLET(S): at 09:04

## 2024-03-20 RX ADMIN — OXYCODONE HYDROCHLORIDE 5 MILLIGRAM(S): 5 TABLET ORAL at 05:57

## 2024-03-20 RX ADMIN — Medication 650 MILLIGRAM(S): at 09:08

## 2024-03-20 RX ADMIN — BENZOCAINE AND MENTHOL 2 LOZENGE: 5; 1 LIQUID ORAL at 05:24

## 2024-03-20 RX ADMIN — POLYETHYLENE GLYCOL 3350 17 GRAM(S): 17 POWDER, FOR SOLUTION ORAL at 11:13

## 2024-03-20 RX ADMIN — FAMOTIDINE 20 MILLIGRAM(S): 10 INJECTION INTRAVENOUS at 11:12

## 2024-03-20 RX ADMIN — OXYCODONE HYDROCHLORIDE 7.5 MILLIGRAM(S): 5 TABLET ORAL at 11:45

## 2024-03-20 RX ADMIN — Medication 137 MICROGRAM(S): at 06:41

## 2024-03-20 NOTE — DISCHARGE NOTE NURSING/CASE MANAGEMENT/SOCIAL WORK - PATIENT PORTAL LINK FT
You can access the FollowMyHealth Patient Portal offered by [facility] by registering at the following website: [email or web address]. By joining [employer]’s FollowMyHealth portal, you will also be able to view your health information using other applications (apps) compatible with our system.

## 2024-03-20 NOTE — PROGRESS NOTE ADULT - ASSESSMENT
COREY WEST is a  33yFemale s/p total thyroid, BL CND, L SND 2a-4 3/18.    PLAN:  - continue regular diet  - continue to monitor CYNDY - no signs of chyle on exam today  - continue to trend calcium today given slight downtrend  - continue calcium repletion  - synthroid

## 2024-03-20 NOTE — PROGRESS NOTE ADULT - SUBJECTIVE AND OBJECTIVE BOX
OTOLARYNGOLOGY (ENT) PROGRESS NOTE    PATIENT: COREY WEST  MRN: [id number]  : [birth year]  [id number]  DATE OF SERVICE:  24  	  Subjective/ Interval: Patient seen and examined at bedside. DEYSI RINALDI. Most recent calciums 7.5, 7.4. Denies numbness, tingling. No perioral numbness/tingling. Pain controlled with medication. Encouraged patient to ambulate.        LABS                       13.2   11.67 )-----------( 217      ( 20 Mar 2024 02:26 )             38.1        132<L>  |  97<L>  |  11  ----------------------------<  118<H>  3.9   |  24  |  0.85    Ca    7.4<L>      20 Mar 2024 02:26  Phos  3.7       Mg     1.70         TPro  6.0  /  Alb  3.4  /  TBili  0.6  /  DBili  x   /  AST  15  /  ALT  12  /  AlkPhos  48           Coagulation Studies-     Urinalysis Basic - ( 20 Mar 2024 02:26 )    Color: x / Appearance: x / SG: x / pH: x  Gluc: 118 mg/dL / Ketone: x  / Bili: x / Urobili: x   Blood: x / Protein: x / Nitrite: x   Leuk Esterase: x / RBC: x / WBC x   Sq Epi: x / Non Sq Epi: x / Bacteria: x      Endocrine Panel-  Calcium: 7.4 mg/dL ( @ 02:26)  Calcium: 7.5 mg/dL ( @ 20:28)  Calcium: 7.9 mg/dL ( @ 13:09)        General: NAD, A+Ox3  Respiratory: No respiratory distress, stridor, or stertor on room air  Voice quality: normal  Face:  Symmetric without masses or lesions  Right: Pinna wnl  Left: Pinna wnl  Neck: soft/flat, no LAD, incision c/d/i w/ steristrips, JPx1 w/ SS output        
OTOLARYNGOLOGY (ENT) PROGRESS NOTE    PATIENT: COREY WEST  MRN: [id number]  : [birth year]  [id number]  DATE OF SERVICE:  24  			           Subjective/ Interval: Patient seen and examined at bedside. AFVSS. Tolerating diet. She reports some numbness and tingling in bilateral hands overnight. No perioral numbness/tingling. Passed TOV yesterday.    ALLERGIES:  peanuts (Swelling)  No Known Drug Allergies      MEDICATIONS:  Antiinfectives:     IV fluids:  calcitriol   Capsule 0.25 MICROGram(s) Oral two times a day  calcium carbonate   1250 mG (OsCal) 1 Tablet(s) Oral every 8 hours    Hematologic/Anticoagulation:    Pain medications/Neuro:  acetaminophen     Tablet .. 650 milliGRAM(s) Oral every 6 hours PRN  ondansetron Injectable 4 milliGRAM(s) IV Push once PRN  oxyCODONE    IR 5 milliGRAM(s) Oral every 4 hours PRN  oxyCODONE    IR 7.5 milliGRAM(s) Oral every 4 hours PRN    Endocrine Medications:   levothyroxine 137 MICROGram(s) Oral daily    All other standing medications:   benzocaine/menthol Lozenge 2 Lozenge Oral every 4 hours  famotidine    Tablet 20 milliGRAM(s) Oral daily  influenza   Vaccine 0.5 milliLiter(s) IntraMuscular once    All other PRN medications:    Vital Signs Last 24 Hrs  T(C): 37.1 (19 Mar 2024 05:35), Max: 37.5 (18 Mar 2024 16:55)  T(F): 98.7 (19 Mar 2024 05:35), Max: 99.5 (18 Mar 2024 16:55)  HR: 68 (19 Mar 2024 05:35) (63 - 96)  BP: 129/81 (19 Mar 2024 05:35) (118/85 - 137/88)  BP(mean): 90 (18 Mar 2024 21:00) (87 - 101)  RR: 18 (19 Mar 2024 05:35) (10 - 18)  SpO2: 97% (19 Mar 2024 05:35) (95% - 98%)    Parameters below as of 19 Mar 2024 05:35  Patient On (Oxygen Delivery Method): room air          - @ 07:01  -  - @ 07:00  --------------------------------------------------------  IN:    Oral Fluid: 300 mL  Total IN: 300 mL    OUT:    Bulb (mL): 35 mL    Voided (mL): 1200 mL  Total OUT: 1235 mL    Total NET: -935 mL          24 @ 07:01  -  24 @ 07:00  --------------------------------------------------------  IN:  Total IN: 0 mL    OUT:    Bulb (mL): 35 mL  Total OUT: 35 mL    Total NET: -35 mL              General: NAD, A+Ox3  Respiratory: No respiratory distress, stridor, or stertor on room air  Voice quality: normal  Face:  Symmetric without masses or lesions  Right: Pinna wnl  Left: Pinna wnl  Neck: soft/flat, no LAD, incision c/d/i w/ steristrips, JPx1 w/ SS output      LABS                       13.5   10 )-----------( 256      ( 19 Mar 2024 04:18 )             39.1        136  |  101  |  12  ----------------------------<  131<H>  4.1   |  23  |  0.77    Ca    8.3<L>      19 Mar 2024 04:18    TPro  6.4  /  Alb  3.7  /  TBili  0.7  /  DBili  x   /  AST  17  /  ALT  14  /  AlkPhos  53           Coagulation Studies-     Urinalysis Basic - ( 19 Mar 2024 04:18 )    Color: x / Appearance: x / SG: x / pH: x  Gluc: 131 mg/dL / Ketone: x  / Bili: x / Urobili: x   Blood: x / Protein: x / Nitrite: x   Leuk Esterase: x / RBC: x / WBC x   Sq Epi: x / Non Sq Epi: x / Bacteria: x      Endocrine Panel-  Calcium: 8.3 mg/dL ( @ 04:18)  Calcium: 8.3 mg/dL ( @ 22:22)                MICROBIOLOGY:        RADIOLOGY & ADDITIONAL STUDIES:    Assessment and Plan:  COREY WEST is a  33yFemale s/p total thyroid, BL CND, L SND 2a-4 3/18.    PLAN:  - continue regular diet  - continue to monitor CYNDY - no signs of chyle on exam today  - continue to trend calcium today  - continue calcium repletion  - synthroid repletion started today  - given symptomatic, continue to monitor today    Page ENT with any questions/concerns.  Peds Page #[id number]  Adult Page #[phone]    Alyse Rosado  24 @ 07:19

## 2024-03-20 NOTE — DISCHARGE NOTE NURSING/CASE MANAGEMENT/SOCIAL WORK - NSDCPEFALRISK_GEN_ALL_CORE
For information on Fall & Injury Prevention, visit: [email or web address] OR  [email or web address] OR  https://www.cdc.gov/steadi/patient.html

## 2024-03-21 ENCOUNTER — EMERGENCY (EMERGENCY)
Facility: HOSPITAL | Age: 34
LOS: 1 days | Discharge: HOME | End: 2024-03-21
Attending: EMERGENCY MEDICINE | Admitting: EMERGENCY MEDICINE
Payer: COMMERCIAL

## 2024-03-21 VITALS
RESPIRATION RATE: 19 BRPM | SYSTOLIC BLOOD PRESSURE: 125 MMHG | OXYGEN SATURATION: 99 % | HEART RATE: 80 BPM | DIASTOLIC BLOOD PRESSURE: 85 MMHG | TEMPERATURE: 99 F

## 2024-03-21 VITALS
DIASTOLIC BLOOD PRESSURE: 91 MMHG | TEMPERATURE: 99 F | SYSTOLIC BLOOD PRESSURE: 129 MMHG | HEIGHT: 68 IN | OXYGEN SATURATION: 99 % | HEART RATE: 101 BPM | RESPIRATION RATE: 18 BRPM

## 2024-03-21 DIAGNOSIS — R20.2 PARESTHESIA OF SKIN: ICD-10-CM

## 2024-03-21 DIAGNOSIS — S61.219A LACERATION WITHOUT FOREIGN BODY OF UNSPECIFIED FINGER WITHOUT DAMAGE TO NAIL, INITIAL ENCOUNTER: Chronic | ICD-10-CM

## 2024-03-21 LAB
ALBUMIN SERPL ELPH-MCNC: 3.7 G/DL — SIGNIFICANT CHANGE UP (ref 3.3–5)
ALP SERPL-CCNC: 63 U/L — SIGNIFICANT CHANGE UP (ref 40–120)
ALT FLD-CCNC: 12 U/L — SIGNIFICANT CHANGE UP (ref 4–33)
ANION GAP SERPL CALC-SCNC: 10 MMOL/L — SIGNIFICANT CHANGE UP (ref 7–14)
AST SERPL-CCNC: 16 U/L — SIGNIFICANT CHANGE UP (ref 4–32)
BASOPHILS # BLD AUTO: 0.05 K/UL — SIGNIFICANT CHANGE UP (ref 0–0.2)
BASOPHILS NFR BLD AUTO: 0.6 % — SIGNIFICANT CHANGE UP (ref 0–2)
BILIRUB SERPL-MCNC: 0.6 MG/DL — SIGNIFICANT CHANGE UP (ref 0.2–1.2)
BLD GP AB SCN SERPL QL: NEGATIVE — SIGNIFICANT CHANGE UP
BUN SERPL-MCNC: 7 MG/DL — SIGNIFICANT CHANGE UP (ref 7–23)
CA-I BLD-SCNC: 1.12 MMOL/L — LOW (ref 1.15–1.29)
CALCIUM SERPL-MCNC: 9 MG/DL — SIGNIFICANT CHANGE UP (ref 8.4–10.5)
CHLORIDE SERPL-SCNC: 98 MMOL/L — SIGNIFICANT CHANGE UP (ref 98–107)
CO2 SERPL-SCNC: 29 MMOL/L — SIGNIFICANT CHANGE UP (ref 22–31)
CREAT SERPL-MCNC: 0.73 MG/DL — SIGNIFICANT CHANGE UP (ref 0.5–1.3)
EGFR: 113 ML/MIN/1.73M2 — SIGNIFICANT CHANGE UP
EOSINOPHIL # BLD AUTO: 0.06 K/UL — SIGNIFICANT CHANGE UP (ref 0–0.5)
EOSINOPHIL NFR BLD AUTO: 0.7 % — SIGNIFICANT CHANGE UP (ref 0–6)
GLUCOSE SERPL-MCNC: 110 MG/DL — HIGH (ref 70–99)
HCT VFR BLD CALC: 38.2 % — SIGNIFICANT CHANGE UP (ref 34.5–45)
HGB BLD-MCNC: 13.2 G/DL — SIGNIFICANT CHANGE UP (ref 11.5–15.5)
IANC: 6.26 K/UL — SIGNIFICANT CHANGE UP (ref 1.8–7.4)
IMM GRANULOCYTES NFR BLD AUTO: 0.8 % — SIGNIFICANT CHANGE UP (ref 0–0.9)
LYMPHOCYTES # BLD AUTO: 1.96 K/UL — SIGNIFICANT CHANGE UP (ref 1–3.3)
LYMPHOCYTES # BLD AUTO: 21.6 % — SIGNIFICANT CHANGE UP (ref 13–44)
MCHC RBC-ENTMCNC: 29.7 PG — SIGNIFICANT CHANGE UP (ref 27–34)
MCHC RBC-ENTMCNC: 34.6 GM/DL — SIGNIFICANT CHANGE UP (ref 32–36)
MCV RBC AUTO: 85.8 FL — SIGNIFICANT CHANGE UP (ref 80–100)
MONOCYTES # BLD AUTO: 0.67 K/UL — SIGNIFICANT CHANGE UP (ref 0–0.9)
MONOCYTES NFR BLD AUTO: 7.4 % — SIGNIFICANT CHANGE UP (ref 2–14)
NEUTROPHILS # BLD AUTO: 6.26 K/UL — SIGNIFICANT CHANGE UP (ref 1.8–7.4)
NEUTROPHILS NFR BLD AUTO: 68.9 % — SIGNIFICANT CHANGE UP (ref 43–77)
NRBC # BLD: 0 /100 WBCS — SIGNIFICANT CHANGE UP (ref 0–0)
NRBC # FLD: 0 K/UL — SIGNIFICANT CHANGE UP (ref 0–0)
PLATELET # BLD AUTO: 226 K/UL — SIGNIFICANT CHANGE UP (ref 150–400)
POTASSIUM SERPL-MCNC: 4 MMOL/L — SIGNIFICANT CHANGE UP (ref 3.5–5.3)
POTASSIUM SERPL-SCNC: 4 MMOL/L — SIGNIFICANT CHANGE UP (ref 3.5–5.3)
PROT SERPL-MCNC: 6.8 G/DL — SIGNIFICANT CHANGE UP (ref 6–8.3)
RBC # BLD: 4.45 M/UL — SIGNIFICANT CHANGE UP (ref 3.8–5.2)
RBC # FLD: 11.9 % — SIGNIFICANT CHANGE UP (ref 10.3–14.5)
RH IG SCN BLD-IMP: POSITIVE — SIGNIFICANT CHANGE UP
SODIUM SERPL-SCNC: 137 MMOL/L — SIGNIFICANT CHANGE UP (ref 135–145)
WBC # BLD: 9.07 K/UL — SIGNIFICANT CHANGE UP (ref 3.8–10.5)
WBC # FLD AUTO: 9.07 K/UL — SIGNIFICANT CHANGE UP (ref 3.8–10.5)

## 2024-03-21 PROCEDURE — 93010 ELECTROCARDIOGRAM REPORT: CPT

## 2024-03-21 PROCEDURE — 99285 EMERGENCY DEPT VISIT HI MDM: CPT

## 2024-03-21 RX ORDER — OXYCODONE HYDROCHLORIDE 5 MG/1
5 TABLET ORAL ONCE
Refills: 0 | Status: DISCONTINUED | OUTPATIENT
Start: 2024-03-21 | End: 2024-03-21

## 2024-03-21 RX ADMIN — OXYCODONE HYDROCHLORIDE 5 MILLIGRAM(S): 5 TABLET ORAL at 14:48

## 2024-03-21 NOTE — ED PROVIDER NOTE - PROGRESS NOTE DETAILS
Ramiro Hernandez PGY3: pt signed out to me, s/p thyroidectomy with c/f hypocalcemia found to have normal calcium levels here. ENT at provider desk during sign out, pt clear for DC to f/u with ENT.   pt reassessed, avss, results discussed. ent f/u discussed. Return precautions discussed, verbal understanding demonstrated, all questions answered.

## 2024-03-21 NOTE — ED ADULT NURSE NOTE - OBJECTIVE STATEMENT
Received pt to Rm 5 from home with c/o intermittent numbness to bilateral hands. Pt had thyroid/parathyroidectomy approx 10 days ago and was advised to return to ED if paresthesias developed. Pt reports upon arrival symptoms resolved. Pt denies chest pain, difficulty breathing, fever, cough or chills, shortness of breath or other physical complaints. Pt is A&OX4, skin warm dry unremarkable, + strong regular radial pulses bi laterally. Pt changed into gown and placed on cardiac monitor showing NSR, #20g IV Placed to R wrist, lab work collected as ordered. Will continue to monitor.

## 2024-03-21 NOTE — ED ADULT NURSE NOTE - NSFALLUNIVINTERV_ED_ALL_ED
Bed/Stretcher in lowest position, wheels locked, appropriate side rails in place/Call bell, personal items and telephone in reach/Instruct patient to call for assistance before getting out of bed/chair/stretcher/Non-slip footwear applied when patient is off stretcher/[place] to call system/Physically safe environment - no spills, clutter or unnecessary equipment/Purposeful proactive rounding/Room/bathroom lighting operational, light cord in reach

## 2024-03-21 NOTE — ED ADULT NURSE NOTE - CAS DISCH CONDITION
LVM for pt letting her know that I was returning her call. When writer last spoke with pt she had said that someone in scheduling had called and left her a voicemail with three different options for therapy locations and numbers to call. Told her that she can go to any of those locations, or could go outside of [place] as well if she would like and to give our office a call with the location so that we can fax the order. Left call back number at the [place] office if needed. Did note that pt has therapy appointments made today.    Improved

## 2024-03-21 NOTE — ED PROVIDER NOTE - NSFOLLOWUPINSTRUCTIONS_ED_ALL_ED_FT
Please follow up with your ENT Doctor.     Please seek immediate medical attention or return to the ED if you have any new or worsening symptoms.

## 2024-03-21 NOTE — ED PROVIDER NOTE - CARE PLAN
Principal Discharge DX:	Paresthesias   1 Melolabial Transposition Flap Text: The defect edges were debeveled with a #15 scalpel blade.  Given the location of the defect and the proximity to free margins a melolabial flap was deemed most appropriate.  Using a sterile surgical marker, an appropriate melolabial transposition flap was drawn incorporating the defect.    The area thus outlined was incised deep to adipose tissue with a #15 scalpel blade.  The skin margins were undermined to an appropriate distance in all directions utilizing iris scissors.

## 2024-03-21 NOTE — CONSULT NOTE ADULT - SUBJECTIVE AND OBJECTIVE BOX
CC: paresthesias     HPI: 33 year old female S/P total thyroidectomy, discharged from [facility] 3/20 on Calcium and rocaltrol. Presents to the ED stating she was been having B/L had paresthesias this morning. Patient denies any cramping, palpitations, Patient states she took and extra dose of calcium prior to coming to ED. Paresthesias now improved.         PAST MEDICAL & SURGICAL HISTORY:  Seasonal allergies      Nontoxic single thyroid nodule      Anxiety and depression      Finger laceration        Allergies    No Known Drug Allergies  peanuts (Swelling)    Intolerances      MEDICATIONS  (STANDING):    MEDICATIONS  (PRN):      ROS:   ENT: all negative except as noted in HPI   CV: denies palpitations  Pulm: denies SOB, cough, hemoptysis  GI: denies change in apetite, indigestion, n/v  : denies pertinent urinary symptoms, urgency  Neuro: denies numbness/tingling, loss of sensation  Psych: denies anxiety  MS: denies muscle weakness, instability  Heme: denies easy bruising or bleeding  Endo: denies heat/cold intolerance, excessive sweating  Vascular: denies LE edema    Vital Signs Last 24 Hrs  T(C): 37.1 (21 Mar 2024 12:14), Max: 37.1 (21 Mar 2024 12:14)  T(F): 98.8 (21 Mar 2024 12:14), Max: 98.8 (21 Mar 2024 12:14)  HR: 101 (21 Mar 2024 12:14) (101 - 101)  BP: 129/91 (21 Mar 2024 12:14) (129/91 - 129/91)  BP(mean): --  RR: 18 (21 Mar 2024 12:14) (18 - 18)  SpO2: 99% (21 Mar 2024 12:14) (99% - 99%)    Parameters below as of 21 Mar 2024 12:14  Patient On (Oxygen Delivery Method): room air                              13.2   9.07  )-----------( 226      ( 21 Mar 2024 13:20 )             38.2    03-21    137  |  98  |  7   ----------------------------<  110<H>  4.0   |  29  |  0.73    Ca    9.0      21 Mar 2024 13:20  Phos  3.7     03-20  Mg     1.70     03-20    TPro  6.8  /  Alb  3.7  /  TBili  0.6  /  DBili  x   /  AST  16  /  ALT  12  /  AlkPhos  63  03-21       PHYSICAL EXAM:  Gen: NAD  Skin: No rashes, bruises, or lesions  Head: Normocephalic, Atraumatic  Face: no edema, erythema, or fluctuance. Parotid glands soft without mass  Eyes: no scleral injection  Ears: Right - ear canal clear, TM intact without effusion or erythema. No evidence of any fluid drainage. No mastoid tenderness, erythema, or ear bulging            Left - ear canal clear, TM intact without effusion or erythema. No evidence of any fluid drainage. No mastoid tenderness, erythema, or ear bulging  Nose: Nares bilaterally patent, no discharge  Mouth: No Stridor / Drooling / Trismus.  Mucosa moist, tongue/uvula midline, oropharynx clear, + chvostek's   Neck: Flat, supple, no lymphadenopathy, trachea midline, no masses  Lymphatic: No lymphadenopathy  Resp: breathing easily, no stridor  CV: no peripheral edema/cyanosis  GI: nondistended   Peripheral vascular: no JVD or edema  Neuro: facial nerve intact, no facial droop

## 2024-03-21 NOTE — ED PROVIDER NOTE - PATIENT PORTAL LINK FT
You can access the FollowMyHealth Patient Portal offered by [facility] by registering at the following website: [email or web address]. By joining [employer]’s FollowMyHealth portal, you will also be able to view your health information using other applications (apps) compatible with our system.

## 2024-03-21 NOTE — ED PROVIDER NOTE - DIFFERENTIAL DIAGNOSIS
Thyroid disorder/hypocalcemia following thyroid surgery, dehydration, electrolyte abnormality, radiculopathy that is now resolved Differential Diagnosis

## 2024-03-21 NOTE — ED ADULT TRIAGE NOTE - CHIEF COMPLAINT QUOTE
c/o B/l intermittent arm numbness since this morning, pt  reports thyroidectomy on Monday. sent in by surgical team for eval / admission.

## 2024-03-21 NOTE — ED PROVIDER NOTE - ATTENDING CONTRIBUTION TO CARE
Dr. Vasquez: This is a 33-year-old female with thyroid cancer, 3 days status post total thyroidectomy by ENT Dr. Mena, has not yet started radiation therapy, in the emergency department with now resolved paresthesias of her hands.  Patient states that she left the hospital yesterday feeling well, no numbness or tingling anywhere, she awoke in the middle of the night noting tingling in one hand which then resolved, and then she awoke again later on and noticed tingling in her other hand, no perioral tingling at all, and she called Dr. Edge office.  Patient has been taking her levothyroxine and calcium, and today when she called Dr. Edge office she was advised to take an extra dose of calcium.  She denies fever, nausea, vomiting, diarrhea.  She has postsurgical neck pain but no discharge from her surgical site.  On exam pt chronically-ill appearing but overall in NAD, heart RRR, lungs CTAB, abd NTND, extremities without swelling, strength 5/5 in all extremities and skin without rash.  Sensation intact and equal in both hands/arms.  Face normal appearing with normal and equal CN II-XII, and no perioral numbness.    I, Adán Vasquez MD, personally made/approved the management plan for this patient and take responsibility for the patient's management. Dr. Vasquez: This is a 33-year-old female with thyroid cancer, 3 days status post total thyroidectomy by ENT Dr. Mena, has not yet started radiation therapy, in the emergency department with now resolved paresthesias of her hands.  Patient states that she left the hospital yesterday feeling well, no numbness or tingling anywhere, she awoke in the middle of the night noting tingling in one hand which then resolved, and then she awoke again later on and noticed tingling in her other hand, no perioral tingling at all, and she called Dr. Edge office.  Patient has been taking her levothyroxine and calcium, and today when she called Dr. Edge office she was advised to take an extra dose of calcium.  She denies fever, nausea, vomiting, diarrhea.  She has postsurgical neck pain but no discharge from her surgical site.  On exam pt chronically-ill appearing but overall in NAD, heart RRR, lungs CTAB, abd NTND, extremities without swelling, strength 5/5 in all extremities and skin without rash.  Sensation intact and equal in both hands/arms.  Face normal appearing with normal and equal CN II-XII, and no perioral numbness.  Surgical site clean/dry/intact, no discharge from site.    I, Adán Vasquez MD, personally made/approved the management plan for this patient and take responsibility for the patient's management. Dr. Vasquez: This is a 33-year-old female with thyroid cancer, 3 days status post total thyroidectomy by ENT Dr. Mena, has not yet started radiation therapy, in the emergency department with now resolved paresthesias of her hands.  Patient states that she left the hospital yesterday feeling well, no numbness or tingling anywhere, she awoke in the middle of the night noting tingling in one hand which then resolved, and then she awoke again later on and noticed tingling in her other hand, no perioral tingling at all, and she called Dr. Edge office.  Patient has been taking her levothyroxine and calcium, and today when she called Dr. Edge office she was advised to take an extra dose of calcium.  She denies fever, nausea, vomiting, diarrhea.  She has postsurgical neck pain but no discharge from her surgical site.  On exam pt chronically-ill appearing but overall in NAD, heart RRR, lungs CTAB, abd NTND, extremities without swelling, strength 5/5 in all extremities and skin without rash.  Sensation intact and equal in both hands/arms.  Face normal appearing with normal and equal CN II-XII, and no perioral numbness.  Surgical site clean/dry/intact, no discharge from site.    Labs reviewed and pt evaluated in person by ENT PA, no indication for medication changes at this time, and no need for admission at this time.  Pt instructed to follow up with ENT and to continue current medication regimen.    IAdán MD, personally made/approved the management plan for this patient and take responsibility for the patient's management.

## 2024-03-25 LAB — SURGICAL PATHOLOGY STUDY: SIGNIFICANT CHANGE UP

## 2024-03-27 ENCOUNTER — APPOINTMENT (OUTPATIENT)
Dept: OTOLARYNGOLOGY | Facility: CLINIC | Age: 34
End: 2024-03-27
Payer: COMMERCIAL

## 2024-03-27 PROCEDURE — 99024 POSTOP FOLLOW-UP VISIT: CPT

## 2024-03-27 NOTE — HISTORY OF PRESENT ILLNESS
[de-identified] : Pt present here for post Left lateral neck dissection, total thyroidectomy and bilateral central compartment neck dissection for thyroid cancer on 3/18/2024. Pt was in ED for numbness in the arm on 3/21/2024 and calcium  9.0. pt is here today c/o left neck pain and swelling this AM, pt has no voice change, swallowing issue, fever,  or numbness or tingling of the arm and legs. 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 Pt is on levothyroxine 137mcg, calcium 500mg TID and calcitriol 0.25mcg bid.  Pt has an appointment with her endocrinologist, Dr. Lund on 4/17/2024.

## 2024-03-27 NOTE — PHYSICAL EXAM
[de-identified] : Wound clean and intact without any swelling, erythema or discharge. left side mid neck with swelling and tenderness, mild firm, no fluctuation or erythema,  [Midline] : trachea located in midline position [Normal] : no rashes

## 2024-03-27 NOTE — REASON FOR VISIT
[Subsequent Evaluation] : a subsequent evaluation for [FreeTextEntry2] : post total thyroidectomy and neck dissection

## 2024-03-27 NOTE — CONSULT LETTER
[Dear  ___] : Dear  [unfilled], [Please see my note below.] : Please see my note below. [Sincerely,] : Sincerely, [FreeTextEntry2] : Vipin Romero MD ([place], [place]) [FreeTextEntry3] : Carol Mena ([place]) [place] [address] Tel: [phone]

## 2024-03-28 LAB
CALCIUM SERPL-MCNC: 9.9 MG/DL
CALCIUM SERPL-MCNC: 9.9 MG/DL
PARATHYROID HORMONE INTACT: 7 PG/ML

## 2024-04-09 LAB
CALCIUM SERPL-MCNC: 9.6 MG/DL
CALCIUM SERPL-MCNC: 9.6 MG/DL
PARATHYROID HORMONE INTACT: 25 PG/ML

## 2024-04-10 ENCOUNTER — APPOINTMENT (OUTPATIENT)
Dept: OTOLARYNGOLOGY | Facility: CLINIC | Age: 34
End: 2024-04-10
Payer: COMMERCIAL

## 2024-04-10 PROCEDURE — 99024 POSTOP FOLLOW-UP VISIT: CPT

## 2024-04-10 NOTE — CONSULT LETTER
[Dear  ___] : Dear  [unfilled], [Courtesy Letter:] : I had the pleasure of seeing your patient, [unfilled], in my office today. [Please see my note below.] : Please see my note below. [Sincerely,] : Sincerely, [FreeTextEntry2] : Vipin Romero MD ([place], [place])   [FreeTextEntry3] : Sanju Mena MD, FACS     [facility] Associate Chair    Department of Otolaryngology  Professor Otolaryngology & Molecular Medicine [facility]

## 2024-04-10 NOTE — HISTORY OF PRESENT ILLNESS
[de-identified] : 34yo female  is s/p Left lateral neck dissection, total thyroidectomy and bilateral central compartment neck dissection for thyroid cancer on 3/18/2024. She presents today for post op follow up, Denies pain, dysphagia,  odynophagia, dysphonia and or dyspnea  4/9/2024 PTH 25 calcium 9.6 3/27/2024 PTH 7 calcium 9.9. She is taking calcium  500mg  BID and calcitriol 0.25 daily  On levothyroxine 137mcg  Appt. with Dr. Lund on 4/17/2024.

## 2024-04-17 ENCOUNTER — APPOINTMENT (OUTPATIENT)
Dept: ENDOCRINOLOGY | Facility: CLINIC | Age: 34
End: 2024-04-17
Payer: COMMERCIAL

## 2024-04-17 VITALS
OXYGEN SATURATION: 99 % | BODY MASS INDEX: 27.13 KG/M2 | HEIGHT: 68 IN | WEIGHT: 179 LBS | HEART RATE: 75 BPM | SYSTOLIC BLOOD PRESSURE: 104 MMHG | DIASTOLIC BLOOD PRESSURE: 70 MMHG

## 2024-04-17 DIAGNOSIS — E03.9 HYPOTHYROIDISM, UNSPECIFIED: ICD-10-CM

## 2024-04-17 DIAGNOSIS — E89.2 POSTPROCEDURAL HYPOPARATHYROIDISM: ICD-10-CM

## 2024-04-17 DIAGNOSIS — C73 MALIGNANT NEOPLASM OF THYROID GLAND: ICD-10-CM

## 2024-04-17 PROCEDURE — 99205 OFFICE O/P NEW HI 60 MIN: CPT

## 2024-04-17 PROCEDURE — G2211 COMPLEX E/M VISIT ADD ON: CPT

## 2024-04-17 NOTE — REVIEW OF SYSTEMS
[TextEntry] : REVIEW OF SYSTEMS: The review of systems is otherwise negative except as noted in HPI.

## 2024-04-17 NOTE — ASSESSMENT
[FreeTextEntry1] : 1. Thyroid Cancer 2. Postsurgical Hypothyroidism  Surgery: 3/18/2024, Dr. Sanju Mena, total thyroidectomy, central and left lateral LND Pathology: Multifocal PTC, classic subtype. Left 4.5 cm, 0.9 cm and 0.6 cm. No increased mitoses/tumor necrosis. No angiolymphatic invasion, no ETE, margins negative for carcinoma. 12/43 LN positive for carcinoma, largest 0.7 cm, LIZ present. Positive LN in levels 6, left level 2, 3, 4. 2015 ROSALEE Risk: High (metastatic LN with LIZ) AJCC 8th edition TNM Stage: T3a N1b Mx, stage II STEVE Treatment: None  PLAN: -She is status post total thyroidectomy and lymph node dissection for a large sized classic PTC with lateral neck disease.  Given metastatic lymphadenopathy and extranodal extension and positive LN, she would benefit from adjuvant STEVE.  -We discussed the basics of STEVE scanning and therapy.  Recommend pretherapy scans followed by adjuvant therapy around 150 mCi.  We discussed common and rare side effects of STEVE including neck discomfort, GI side effects, sialoadenitis, dacryocystitis, minor latent risk of secondary cancers.  We discussed the need for low iodine diet and Thyrogen injections prior to scanning and treatment. -Check TFTs, thyroglobulin panel -Continue levothyroxine 137 mcg daily, appropriate medication hygiene discussed.  TSH goal <0.1 for now given ROSALEE high risk thyroid cancer.  Can adjust medication based on TFTs. -Thyroid US 4 to 6 months after STEVE -We discussed the basic management and post treatment surveillance for thyroid cancer, including serial neck exams, thyroglobulin monitoring and neck ultrasounds. -We discussed implications of thyroid cancer management in the peripartum period and during pregnancy.  Do not recommend STEVE scanning or treatment during pregnancy.  Do not recommend getting pregnant for 6 months after STEVE therapy.  Discussed that levothyroxine doses increased during pregnancy, she should alert us in the event that she is pregnant.   3. Postsurgical Hypoparathyroidism Current regimen: Normal calcium/PTH from 4/9/2024 -Check calcium/PTH.  If normal, can stop all calcium supplements.  Can still take as needed if developing symptoms of hypocalcemia such as numbness/tingling in fingers or periorally.   RTC in 3 to 4 months.  Petey Lund MD [facility] [place]  [address] [place] [address], [address] Ph: [phone] Fax: [phone]  I have spent 60 minutes of time on the encounter. Greater than 50% of the face to face encounter time was spent on counseling and/or coordination of care for management of thyroid cancer. Total time was spent on review of prior records, labs and imaging studies, history and physical examination, documentation of this encounter, placing orders, counseling and coordination of care, all on the date of this visit.

## 2024-04-17 NOTE — HISTORY OF PRESENT ILLNESS
[FreeTextEntry1] : CHIEF COMPLAINT: Thyroid cancer, postsurgical hypothyroidism REFERRED BY: Dr. Sanju Mena   HISTORY OF PRESENTING ILLNESS: The patient is a 33-year-old female being seen in the office today for evaluation of thyroid cancer, postsurgical hypothyroidism.   Initially noted to have a thyroid nodule in 2/2024, palpated by PCP. Thyroid US 3/5/2024: Dominant left 5.8 cm TR 5 nodule.  Left level three 1.2 cm indeterminate LN. 2/9/2024: FNA of left dominant 5.2 cm nodule [place] 6/malignant 3/7/2024: FNA of left level 3 LN positive for malignant cells   Surgery: 3/18/2024, Dr. Sanju Mena, total thyroidectomy, central and left lateral LND Pathology: Multifocal PTC, classic subtype.  Left 4.5 cm, 0.9 cm and 0.6 cm.  No increased mitoses/tumor necrosis.  No angiolymphatic invasion, no ETE, margins negative for carcinoma.  12/43 LN positive for carcinoma, largest 0.7 cm, LIZ present.  Positive LN in levels 6, left level 2, 3, 4. 2015 ROSALEE Risk: High (metastatic LN with LIZ) AJCC 8th edition TNM Stage: T3a N1b Mx, stage II STEVE Treatment: None   Postsurgical Hypothyroidism: She is currently taking 137 mcg daily of levothyroxine. Reports compliance with medication. She is taking it appropriately, on an empty stomach at least 30 minutes before food. Neck incision has been healing well, endorses mild pain and swelling in neck.  Voice is okay, better than in the initial postsurgical period, feels tired when talking a lot.  Denies palpitations/tremors, endorses heat and cold intolerance, endorses fatigue.  Weight has been stable, denies constipation/diarrhea, lower extremity swelling.  Denies hair/skin changes, denies dysphagia/dyspnea.  Postsurgical hypoparathyroidism: Currently on calcitriol 0.25 mcg once daily and calcium supplement 1000 mg once daily.  She occasionally feels numbness/tingling in her fingers, but reports a history of multiple dog bites in the last year which resulted in tenosynovitis, and feels that this contributes to her paresthesias in fingers.   No family history of thyroid cancer or thyroid disease. No personal history of radiation exposure to the head and neck area.  Social history: She is a .  She has a partner, may get engaged in the near future.  She would like to plan pregnancy in the next 2 to 3 years.

## 2024-04-17 NOTE — PHYSICAL EXAM
[TextEntry] : PHYSICAL EXAMINATION: Vital signs from today's encounter reviewed.  GENERAL: No acute distress, clinically eukinetic, normal appearance HEAD: Normocephalic, atraumatic EYES: conjunctivae are pink and moist, no icterus, no proptosis  NECK: Well-healing thyroidectomy incision, non-tender, no adenopathy CARDIOVASCULAR: well-perfused extremities, no peripheral edema RESPIRATORY: normal chest expansion with good pulmonary effort, no acute respiratory distress MUSCULOSKELETAL: no swelling, normal range of motion, normal gait SKIN: no pallor, no icterus, no rash  NEUROLOGIC: alert and oriented, no evident focal deficits, no tremors  PSYCHIATRIC: mood and affect are normal ENDOCRINE: No obvious stigmata of Cushing's or acromegaly present

## 2024-04-18 LAB
ALBUMIN SERPL ELPH-MCNC: 4.4 G/DL
ALP BLD-CCNC: 69 U/L
ALT SERPL-CCNC: 16 U/L
ANION GAP SERPL CALC-SCNC: 10 MMOL/L
AST SERPL-CCNC: 16 U/L
BILIRUB SERPL-MCNC: 0.6 MG/DL
BUN SERPL-MCNC: 11 MG/DL
CALCIUM SERPL-MCNC: 9.3 MG/DL
CALCIUM SERPL-MCNC: 9.3 MG/DL
CHLORIDE SERPL-SCNC: 104 MMOL/L
CO2 SERPL-SCNC: 24 MMOL/L
CREAT SERPL-MCNC: 0.82 MG/DL
EGFR: 98 ML/MIN/1.73M2
ESTIMATED AVERAGE GLUCOSE: 97 MG/DL
GLUCOSE SERPL-MCNC: 87 MG/DL
HBA1C MFR BLD HPLC: 5 %
PARATHYROID HORMONE INTACT: 24 PG/ML
POTASSIUM SERPL-SCNC: 4.5 MMOL/L
PROT SERPL-MCNC: 7 G/DL
SODIUM SERPL-SCNC: 139 MMOL/L
T4 FREE SERPL-MCNC: 2.3 NG/DL
TSH SERPL-ACNC: 1.01 UIU/ML

## 2024-04-18 RX ORDER — LEVOTHYROXINE SODIUM 0.14 MG/1
137 TABLET ORAL
Qty: 90 | Refills: 2 | Status: ACTIVE | COMMUNITY
Start: 2024-04-18 | End: 1900-01-01

## 2024-04-21 LAB
THYROGLOB AB SERPL-ACNC: <1 IU/ML
THYROGLOB SERPL-MCNC: 0.3 NG/ML

## 2024-05-20 ENCOUNTER — APPOINTMENT (OUTPATIENT)
Dept: NUCLEAR MEDICINE | Facility: HOSPITAL | Age: 34
End: 2024-05-20
Payer: COMMERCIAL

## 2024-05-20 ENCOUNTER — OUTPATIENT (OUTPATIENT)
Dept: OUTPATIENT SERVICES | Facility: HOSPITAL | Age: 34
LOS: 1 days | End: 2024-05-20
Payer: COMMERCIAL

## 2024-05-20 DIAGNOSIS — S61.219A LACERATION WITHOUT FOREIGN BODY OF UNSPECIFIED FINGER WITHOUT DAMAGE TO NAIL, INITIAL ENCOUNTER: Chronic | ICD-10-CM

## 2024-05-20 DIAGNOSIS — C73 MALIGNANT NEOPLASM OF THYROID GLAND: ICD-10-CM

## 2024-05-20 LAB — HCG SERPL-ACNC: <2 MIU/ML — SIGNIFICANT CHANGE UP

## 2024-05-21 ENCOUNTER — APPOINTMENT (OUTPATIENT)
Dept: NUCLEAR MEDICINE | Facility: HOSPITAL | Age: 34
End: 2024-05-21

## 2024-05-22 ENCOUNTER — RESULT REVIEW (OUTPATIENT)
Age: 34
End: 2024-05-22

## 2024-05-22 ENCOUNTER — APPOINTMENT (OUTPATIENT)
Dept: NUCLEAR MEDICINE | Facility: HOSPITAL | Age: 34
End: 2024-05-22

## 2024-05-24 ENCOUNTER — APPOINTMENT (OUTPATIENT)
Dept: NUCLEAR MEDICINE | Facility: HOSPITAL | Age: 34
End: 2024-05-24

## 2024-05-24 ENCOUNTER — RESULT REVIEW (OUTPATIENT)
Age: 34
End: 2024-05-24

## 2024-05-24 PROCEDURE — 96372 THER/PROPH/DIAG INJ SC/IM: CPT

## 2024-05-24 PROCEDURE — A9528: CPT

## 2024-05-24 PROCEDURE — 78018 THYROID MET IMAGING BODY: CPT

## 2024-05-24 PROCEDURE — 78830 RP LOCLZJ TUM SPECT W/CT 1: CPT | Mod: 26

## 2024-05-24 PROCEDURE — 84702 CHORIONIC GONADOTROPIN TEST: CPT

## 2024-05-24 PROCEDURE — 36415 COLL VENOUS BLD VENIPUNCTURE: CPT

## 2024-05-24 PROCEDURE — 78020 THYROID MET UPTAKE: CPT

## 2024-05-24 PROCEDURE — 78018 THYROID MET IMAGING BODY: CPT | Mod: 26

## 2024-05-24 PROCEDURE — 78020 THYROID MET UPTAKE: CPT | Mod: 26

## 2024-05-24 PROCEDURE — 78830 RP LOCLZJ TUM SPECT W/CT 1: CPT

## 2024-06-14 NOTE — ASU PATIENT PROFILE, ADULT - PRESSURE ULCER(S)
Patient does not usually miss appointments - left message on voice mail to call us - no home address on file ([address]) - advised via voice mail that we would fit him in later today if he wanted to call us back - left number [phone]  
no

## 2024-06-17 ENCOUNTER — APPOINTMENT (OUTPATIENT)
Dept: NUCLEAR MEDICINE | Facility: HOSPITAL | Age: 34
End: 2024-06-17
Payer: COMMERCIAL

## 2024-06-17 ENCOUNTER — OUTPATIENT (OUTPATIENT)
Dept: OUTPATIENT SERVICES | Facility: HOSPITAL | Age: 34
LOS: 1 days | End: 2024-06-17
Payer: COMMERCIAL

## 2024-06-17 DIAGNOSIS — C73 MALIGNANT NEOPLASM OF THYROID GLAND: ICD-10-CM

## 2024-06-17 DIAGNOSIS — S61.219A LACERATION WITHOUT FOREIGN BODY OF UNSPECIFIED FINGER WITHOUT DAMAGE TO NAIL, INITIAL ENCOUNTER: Chronic | ICD-10-CM

## 2024-06-17 LAB — HCG SERPL-ACNC: <2 MIU/ML — SIGNIFICANT CHANGE UP

## 2024-06-17 PROCEDURE — 99203 OFFICE O/P NEW LOW 30 MIN: CPT

## 2024-06-17 RX ORDER — ONDANSETRON 4 MG/1
4 TABLET, ORALLY DISINTEGRATING ORAL 4 TIMES DAILY
Qty: 20 | Refills: 0 | Status: ACTIVE | COMMUNITY
Start: 2024-06-17 | End: 1900-01-01

## 2024-06-18 ENCOUNTER — APPOINTMENT (OUTPATIENT)
Dept: NUCLEAR MEDICINE | Facility: HOSPITAL | Age: 34
End: 2024-06-18

## 2024-06-19 ENCOUNTER — RESULT REVIEW (OUTPATIENT)
Age: 34
End: 2024-06-19

## 2024-06-19 ENCOUNTER — APPOINTMENT (OUTPATIENT)
Dept: NUCLEAR MEDICINE | Facility: HOSPITAL | Age: 34
End: 2024-06-19

## 2024-06-19 PROCEDURE — 79005 NUCLEAR RX ORAL ADMIN: CPT | Mod: 26

## 2024-06-24 ENCOUNTER — APPOINTMENT (OUTPATIENT)
Dept: NUCLEAR MEDICINE | Facility: HOSPITAL | Age: 34
End: 2024-06-24

## 2024-06-24 ENCOUNTER — RESULT REVIEW (OUTPATIENT)
Age: 34
End: 2024-06-24

## 2024-06-24 PROCEDURE — 78018 THYROID MET IMAGING BODY: CPT | Mod: 26

## 2024-06-24 PROCEDURE — 78830 RP LOCLZJ TUM SPECT W/CT 1: CPT | Mod: 26

## 2024-06-24 PROCEDURE — 78018 THYROID MET IMAGING BODY: CPT

## 2024-06-24 PROCEDURE — 79005 NUCLEAR RX ORAL ADMIN: CPT

## 2024-06-24 PROCEDURE — 36415 COLL VENOUS BLD VENIPUNCTURE: CPT

## 2024-06-24 PROCEDURE — A9517: CPT

## 2024-06-24 PROCEDURE — 96372 THER/PROPH/DIAG INJ SC/IM: CPT

## 2024-06-24 PROCEDURE — 78830 RP LOCLZJ TUM SPECT W/CT 1: CPT

## 2024-06-24 PROCEDURE — 84702 CHORIONIC GONADOTROPIN TEST: CPT

## 2024-07-03 LAB
25(OH)D3 SERPL-MCNC: 20.5 NG/ML
ALBUMIN SERPL ELPH-MCNC: 4.2 G/DL
ALP BLD-CCNC: 62 U/L
ALT SERPL-CCNC: 19 U/L
ANION GAP SERPL CALC-SCNC: 12 MMOL/L
AST SERPL-CCNC: 13 U/L
BILIRUB SERPL-MCNC: 0.3 MG/DL
BUN SERPL-MCNC: 11 MG/DL
CALCIUM SERPL-MCNC: 8.9 MG/DL
CALCIUM SERPL-MCNC: 8.9 MG/DL
CHLORIDE SERPL-SCNC: 107 MMOL/L
CO2 SERPL-SCNC: 24 MMOL/L
CREAT SERPL-MCNC: 0.87 MG/DL
EGFR: 91 ML/MIN/1.73M2
GLUCOSE SERPL-MCNC: 79 MG/DL
PARATHYROID HORMONE INTACT: 30 PG/ML
POTASSIUM SERPL-SCNC: 4.2 MMOL/L
PROT SERPL-MCNC: 6.5 G/DL
SODIUM SERPL-SCNC: 142 MMOL/L
T4 FREE SERPL-MCNC: 1.5 NG/DL
THYROGLOB AB SERPL-ACNC: <20 IU/ML
THYROGLOB SERPL-MCNC: 3.01 NG/ML
TSH SERPL-ACNC: 2.02 UIU/ML

## 2024-08-22 ENCOUNTER — APPOINTMENT (OUTPATIENT)
Dept: ENDOCRINOLOGY | Facility: CLINIC | Age: 34
End: 2024-08-22

## 2024-08-26 ENCOUNTER — APPOINTMENT (OUTPATIENT)
Dept: ENDOCRINOLOGY | Facility: CLINIC | Age: 34
End: 2024-08-26
Payer: COMMERCIAL

## 2024-08-26 VITALS
BODY MASS INDEX: 29.7 KG/M2 | SYSTOLIC BLOOD PRESSURE: 116 MMHG | WEIGHT: 196 LBS | OXYGEN SATURATION: 98 % | HEART RATE: 74 BPM | HEIGHT: 68 IN | DIASTOLIC BLOOD PRESSURE: 72 MMHG

## 2024-08-26 DIAGNOSIS — E03.9 HYPOTHYROIDISM, UNSPECIFIED: ICD-10-CM

## 2024-08-26 DIAGNOSIS — E89.2 POSTPROCEDURAL HYPOPARATHYROIDISM: ICD-10-CM

## 2024-08-26 DIAGNOSIS — C73 MALIGNANT NEOPLASM OF THYROID GLAND: ICD-10-CM

## 2024-08-26 PROCEDURE — G2211 COMPLEX E/M VISIT ADD ON: CPT | Mod: NC

## 2024-08-26 PROCEDURE — 99215 OFFICE O/P EST HI 40 MIN: CPT

## 2024-08-26 NOTE — HISTORY OF PRESENT ILLNESS
[FreeTextEntry1] : CHIEF COMPLAINT: Thyroid cancer, postsurgical hypothyroidism Surgeon: Dr. Sanju Mena   HISTORY OF PRESENTING ILLNESS: The patient is a 34-year-old female being seen in the office today for evaluation of thyroid cancer, postsurgical hypothyroidism.   Initially noted to have a thyroid nodule in 2/2024, palpated by PCP. Thyroid US 3/5/2024: Dominant left 5.8 cm TR 5 nodule.  Left level three 1.2 cm indeterminate LN. 2/9/2024: FNA of left dominant 5.2 cm nodule [place] 6/malignant 3/7/2024: FNA of left level 3 LN positive for malignant cells   Surgery: 3/18/2024, Dr. Sanju Mnea, total thyroidectomy, central and left lateral LND Pathology: Multifocal PTC, classic subtype.  Left 4.5 cm, 0.9 cm and 0.6 cm.  No increased mitoses/tumor necrosis.  No angiolymphatic invasion, no ETE, margins negative for carcinoma.  12/43 LN positive for carcinoma, largest 0.7 cm, LIZ present.  Positive LN in levels 6, left level 2, 3, 4. 2015 ROSALEE Risk: High (metastatic LN with LIZ) AJCC 8th edition TNM Stage: T3a N1b Mx, stage II STEVE Treatment: 144.6 mCi 6/21/2024.  Suppressed thyroglobulin <0.2, stimulated TG 6.71.  Posttherapy scan with iodine uptake in the thyroid bed and central neck compartment, no distant iodine uptake.  Surveillance: 7/2024: Thyroglobulin 3.01, negative TgAb   Postsurgical Hypothyroidism: She is currently taking 150 mcg daily of levothyroxine. Reports compliance with medication. She is taking it appropriately, on an empty stomach at least 30 minutes before food. Neck incision has been healing well, endorses some swelling under her chin.  Voice is okay.  Reports occasional cramps in her hands.  Takes over-the-counter vitamin D supplements, rarely taking Tums/calcium supplements.  Denies palpitations or tremors, denies constipation or diarrhea.  Endorses heat intolerance/hot flashes with nausea.  No dysphagia.  Endorses weight gain of around 20 pounds.   No family history of thyroid cancer or thyroid disease. No personal history of radiation exposure to the head and neck area.  Social history: She is a .  She has a partner, may get engaged in the near future.  She would like to plan pregnancy in the next 2 to 3 years.

## 2024-08-26 NOTE — ASSESSMENT
[FreeTextEntry1] : 1. Thyroid Cancer 2. Postsurgical Hypothyroidism  Surgery: 3/18/2024, Dr. Sanju Mena, total thyroidectomy, central and left lateral LND Pathology: Multifocal PTC, classic subtype. Left 4.5 cm, 0.9 cm and 0.6 cm. No increased mitoses/tumor necrosis. No angiolymphatic invasion, no ETE, margins negative for carcinoma. 12/43 LN positive for carcinoma, largest 0.7 cm, LIZ present. Positive LN in levels 6, left level 2, 3, 4. 2015 ROSALEE Risk: High (metastatic LN with LIZ) AJCC 8th edition TNM Stage: T3a N1b Mx, stage II STEVE Treatment: 144.6 mCi 6/21/2024. Suppressed thyroglobulin <0.2, stimulated TG 6.71. Posttherapy scan with iodine uptake in the thyroid bed and central neck compartment, no distant iodine uptake.  Surveillance: 7/2024: Thyroglobulin 3.01, negative TgAb  PLAN: -She is status post total thyroidectomy/lymph node dissection and STEVE for a large sized classic PTC with lateral neck disease.  She had metastatic lymphadenopathy and extranodal extension in positive LN, which places her at a relatively high risk of recurrent disease. -Check TFTs, thyroglobulin panel -Continue levothyroxine 150 mcg daily, appropriate medication hygiene discussed.  TSH goal around 0.1 for now given ROSALEE high risk thyroid cancer.  Can adjust medication based on TFTs. -Thyroid US 4 to 6 months after STEVE, prescription provided today -We discussed the basic management and post treatment surveillance for thyroid cancer, including serial neck exams, thyroglobulin monitoring and neck ultrasounds. -We discussed implications of thyroid cancer management in the peripartum period and during pregnancy.  Do not recommend STEVE scanning or treatment during pregnancy.  Do not recommend getting pregnant for 6 months after STEVE therapy.  Discussed that levothyroxine doses increased during pregnancy, she should alert us in the event that she is pregnant. -Occasionally having cramping in fingers/hands, recheck calcium/PTH/vitamin D.   RTC in 6 months.  Petey Lund MD [facility] at [address], [address] Ph: [phone] Fax: [phone]  I have spent 40 minutes of time on the encounter. Greater than 50% of the face to face encounter time was spent on counseling and/or coordination of care for management of thyroid cancer. Total time was spent on review of prior records, labs and imaging studies, history and physical examination, documentation of this encounter, placing orders, counseling and coordination of care, all on the date of this visit.

## 2024-08-26 NOTE — PHYSICAL EXAM
[TextEntry] : PHYSICAL EXAMINATION: Vital signs from today's encounter reviewed.  GENERAL: No acute distress, clinically eukinetic, normal appearance HEAD: Normocephalic, atraumatic EYES: conjunctivae are pink and moist, no icterus, no proptosis  NECK: Well-healing thyroidectomy incision, non-tender, no adenopathy CARDIOVASCULAR: well-perfused extremities, no peripheral edema RESPIRATORY: normal chest expansion with good pulmonary effort, no acute respiratory distress NEUROLOGIC: alert and oriented, no evident focal deficits, no tremors  PSYCHIATRIC: mood and affect are normal ENDOCRINE: No obvious stigmata of Cushing's or acromegaly present

## 2024-08-27 ENCOUNTER — NON-APPOINTMENT (OUTPATIENT)
Age: 34
End: 2024-08-27

## 2024-08-27 LAB
25(OH)D3 SERPL-MCNC: 28.7 NG/ML
CALCIUM SERPL-MCNC: 8.5 MG/DL
PARATHYROID HORMONE INTACT: 45 PG/ML
T4 FREE SERPL-MCNC: 1.6 NG/DL
THYROGLOB AB SERPL-ACNC: <20 IU/ML
THYROGLOB SERPL-MCNC: <0.2 NG/ML
TSH SERPL-ACNC: 3.15 UIU/ML

## 2024-10-08 ENCOUNTER — APPOINTMENT (OUTPATIENT)
Dept: ULTRASOUND IMAGING | Facility: CLINIC | Age: 34
End: 2024-10-08
Payer: COMMERCIAL

## 2024-10-08 ENCOUNTER — RX RENEWAL (OUTPATIENT)
Age: 34
End: 2024-10-08

## 2024-10-08 PROCEDURE — 76536 US EXAM OF HEAD AND NECK: CPT

## 2024-10-15 ENCOUNTER — APPOINTMENT (OUTPATIENT)
Dept: OTOLARYNGOLOGY | Facility: CLINIC | Age: 34
End: 2024-10-15

## 2024-12-09 ENCOUNTER — TRANSCRIPTION ENCOUNTER (OUTPATIENT)
Age: 34
End: 2024-12-09

## 2024-12-10 ENCOUNTER — TRANSCRIPTION ENCOUNTER (OUTPATIENT)
Age: 34
End: 2024-12-10

## 2024-12-24 ENCOUNTER — APPOINTMENT (OUTPATIENT)
Dept: AFTER HOURS CARE | Facility: EMERGENCY ROOM | Age: 34
End: 2024-12-24

## 2024-12-24 DIAGNOSIS — K11.21 ACUTE SIALOADENITIS: ICD-10-CM

## 2024-12-24 PROCEDURE — 99214 OFFICE O/P EST MOD 30 MIN: CPT

## 2024-12-24 RX ORDER — AMOXICILLIN AND CLAVULANATE POTASSIUM 875; 125 MG/1; MG/1
875-125 TABLET, COATED ORAL
Qty: 14 | Refills: 0 | Status: ACTIVE | COMMUNITY
Start: 2024-12-24 | End: 1900-01-01

## 2025-01-22 ENCOUNTER — APPOINTMENT (OUTPATIENT)
Dept: OTOLARYNGOLOGY | Facility: CLINIC | Age: 35
End: 2025-01-22
Payer: COMMERCIAL

## 2025-01-22 VITALS
BODY MASS INDEX: 30.31 KG/M2 | HEIGHT: 68 IN | HEART RATE: 101 BPM | SYSTOLIC BLOOD PRESSURE: 120 MMHG | DIASTOLIC BLOOD PRESSURE: 83 MMHG | WEIGHT: 200 LBS

## 2025-01-22 DIAGNOSIS — C73 MALIGNANT NEOPLASM OF THYROID GLAND: ICD-10-CM

## 2025-01-22 PROCEDURE — 99214 OFFICE O/P EST MOD 30 MIN: CPT

## 2025-01-22 RX ORDER — FLUOXETINE HYDROCHLORIDE 60 MG/1
TABLET ORAL
Refills: 0 | Status: ACTIVE | COMMUNITY

## 2025-03-10 ENCOUNTER — APPOINTMENT (OUTPATIENT)
Dept: ENDOCRINOLOGY | Facility: CLINIC | Age: 35
End: 2025-03-10
Payer: COMMERCIAL

## 2025-03-10 VITALS
HEIGHT: 68 IN | HEART RATE: 89 BPM | SYSTOLIC BLOOD PRESSURE: 120 MMHG | WEIGHT: 198 LBS | OXYGEN SATURATION: 98 % | BODY MASS INDEX: 30.01 KG/M2 | DIASTOLIC BLOOD PRESSURE: 90 MMHG

## 2025-03-10 DIAGNOSIS — C73 MALIGNANT NEOPLASM OF THYROID GLAND: ICD-10-CM

## 2025-03-10 DIAGNOSIS — E03.9 HYPOTHYROIDISM, UNSPECIFIED: ICD-10-CM

## 2025-03-10 LAB
T4 FREE SERPL-MCNC: 2.3 NG/DL
THYROGLOB AB SERPL-ACNC: 18.6 IU/ML
THYROGLOB SERPL-MCNC: <0.1 NG/ML
TSH SERPL-ACNC: 0.07 UIU/ML

## 2025-03-10 PROCEDURE — G2211 COMPLEX E/M VISIT ADD ON: CPT | Mod: NC

## 2025-03-10 PROCEDURE — 99215 OFFICE O/P EST HI 40 MIN: CPT

## 2025-03-31 ENCOUNTER — APPOINTMENT (OUTPATIENT)
Dept: ULTRASOUND IMAGING | Facility: CLINIC | Age: 35
End: 2025-03-31
Payer: COMMERCIAL

## 2025-03-31 PROCEDURE — 76536 US EXAM OF HEAD AND NECK: CPT

## 2025-04-01 ENCOUNTER — TRANSCRIPTION ENCOUNTER (OUTPATIENT)
Age: 35
End: 2025-04-01

## 2025-06-03 ENCOUNTER — TRANSCRIPTION ENCOUNTER (OUTPATIENT)
Age: 35
End: 2025-06-03

## 2025-06-04 ENCOUNTER — APPOINTMENT (OUTPATIENT)
Dept: OTOLARYNGOLOGY | Facility: CLINIC | Age: 35
End: 2025-06-04
Payer: COMMERCIAL

## 2025-06-04 VITALS
SYSTOLIC BLOOD PRESSURE: 124 MMHG | DIASTOLIC BLOOD PRESSURE: 89 MMHG | HEIGHT: 68 IN | OXYGEN SATURATION: 97 % | HEART RATE: 78 BPM | WEIGHT: 198 LBS | BODY MASS INDEX: 30.01 KG/M2

## 2025-06-04 DIAGNOSIS — C73 MALIGNANT NEOPLASM OF THYROID GLAND: ICD-10-CM

## 2025-06-04 PROCEDURE — 99214 OFFICE O/P EST MOD 30 MIN: CPT

## 2025-06-20 ENCOUNTER — TRANSCRIPTION ENCOUNTER (OUTPATIENT)
Age: 35
End: 2025-06-20

## 2025-07-03 ENCOUNTER — APPOINTMENT (OUTPATIENT)
Facility: CLINIC | Age: 35
End: 2025-07-03
Payer: COMMERCIAL

## 2025-07-03 PROBLEM — I89.0 LYMPHEDEMA: Status: ACTIVE | Noted: 2025-07-03

## 2025-07-03 PROBLEM — G62.9 NEUROPATHY: Status: ACTIVE | Noted: 2025-07-03

## 2025-07-03 PROCEDURE — 99204 OFFICE O/P NEW MOD 45 MIN: CPT

## 2025-07-07 ENCOUNTER — APPOINTMENT (OUTPATIENT)
Dept: FAMILY MEDICINE | Facility: CLINIC | Age: 35
End: 2025-07-07

## 2025-07-18 ENCOUNTER — APPOINTMENT (OUTPATIENT)
Dept: INTERNAL MEDICINE | Facility: CLINIC | Age: 35
End: 2025-07-18
Payer: COMMERCIAL

## 2025-07-18 VITALS
RESPIRATION RATE: 16 BRPM | TEMPERATURE: 97.6 F | WEIGHT: 204 LBS | SYSTOLIC BLOOD PRESSURE: 136 MMHG | HEIGHT: 68 IN | DIASTOLIC BLOOD PRESSURE: 93 MMHG | BODY MASS INDEX: 30.92 KG/M2 | OXYGEN SATURATION: 99 % | HEART RATE: 71 BPM

## 2025-07-18 PROBLEM — L91.8 SKIN TAG: Status: ACTIVE | Noted: 2025-07-18

## 2025-07-18 PROCEDURE — 99213 OFFICE O/P EST LOW 20 MIN: CPT

## 2025-07-21 ENCOUNTER — NON-APPOINTMENT (OUTPATIENT)
Age: 35
End: 2025-07-21

## 2025-07-21 ENCOUNTER — APPOINTMENT (OUTPATIENT)
Dept: DERMATOLOGY | Facility: CLINIC | Age: 35
End: 2025-07-21
Payer: COMMERCIAL

## 2025-07-21 VITALS — WEIGHT: 204 LBS | HEIGHT: 68 IN | BODY MASS INDEX: 30.92 KG/M2

## 2025-07-21 PROCEDURE — 11303 SHAVE SKIN LESION >2.0 CM: CPT | Mod: 59

## 2025-07-21 PROCEDURE — 17110 DESTRUCTION B9 LES UP TO 14: CPT | Mod: 59

## 2025-07-21 PROCEDURE — 99204 OFFICE O/P NEW MOD 45 MIN: CPT | Mod: 25

## 2025-07-21 RX ORDER — IMIQUIMOD 50 MG/G
5 CREAM TOPICAL
Qty: 1 | Refills: 1 | Status: ACTIVE | COMMUNITY
Start: 2025-07-21 | End: 1900-01-01

## 2025-07-21 RX ORDER — MUPIROCIN 20 MG/G
2 OINTMENT TOPICAL
Qty: 1 | Refills: 0 | Status: ACTIVE | COMMUNITY
Start: 2025-07-21 | End: 1900-01-01

## 2025-07-25 LAB — CORE LAB BIOPSY: NORMAL

## 2025-08-06 ENCOUNTER — APPOINTMENT (OUTPATIENT)
Dept: DERMATOLOGY | Facility: CLINIC | Age: 35
End: 2025-08-06
Payer: COMMERCIAL

## 2025-08-06 VITALS — HEIGHT: 68 IN | BODY MASS INDEX: 30.92 KG/M2 | WEIGHT: 204 LBS

## 2025-08-06 DIAGNOSIS — B07.9 VIRAL WART, UNSPECIFIED: ICD-10-CM

## 2025-08-06 DIAGNOSIS — L98.9 DISORDER OF THE SKIN AND SUBCUTANEOUS TISSUE, UNSPECIFIED: ICD-10-CM

## 2025-08-06 DIAGNOSIS — Z86.03 PERSONAL HISTORY OF NEOPLASM OF UNCERTAIN BEHAVIOR: ICD-10-CM

## 2025-08-06 PROCEDURE — 17110 DESTRUCTION B9 LES UP TO 14: CPT

## 2025-08-06 PROCEDURE — 99214 OFFICE O/P EST MOD 30 MIN: CPT | Mod: 25

## 2025-08-21 ENCOUNTER — APPOINTMENT (OUTPATIENT)
Facility: CLINIC | Age: 35
End: 2025-08-21

## 2025-08-21 ENCOUNTER — TRANSCRIPTION ENCOUNTER (OUTPATIENT)
Age: 35
End: 2025-08-21

## 2025-08-21 DIAGNOSIS — R59.9 ENLARGED LYMPH NODES, UNSPECIFIED: ICD-10-CM

## 2025-08-21 DIAGNOSIS — I89.0 LYMPHEDEMA, NOT ELSEWHERE CLASSIFIED: ICD-10-CM

## 2025-08-21 DIAGNOSIS — G62.9 POLYNEUROPATHY, UNSPECIFIED: ICD-10-CM

## 2025-08-21 DIAGNOSIS — M62.838 OTHER MUSCLE SPASM: ICD-10-CM

## 2025-08-21 PROCEDURE — 99214 OFFICE O/P EST MOD 30 MIN: CPT

## 2025-08-21 RX ORDER — CYCLOBENZAPRINE HYDROCHLORIDE 5 MG/1
5 TABLET, FILM COATED ORAL 3 TIMES DAILY
Qty: 42 | Refills: 0 | Status: ACTIVE | COMMUNITY
Start: 2025-08-21 | End: 1900-01-01

## 2025-08-29 ENCOUNTER — NON-APPOINTMENT (OUTPATIENT)
Age: 35
End: 2025-08-29

## 2025-08-29 ENCOUNTER — APPOINTMENT (OUTPATIENT)
Dept: ENDOCRINOLOGY | Facility: CLINIC | Age: 35
End: 2025-08-29
Payer: COMMERCIAL

## 2025-08-29 VITALS
HEART RATE: 80 BPM | SYSTOLIC BLOOD PRESSURE: 118 MMHG | OXYGEN SATURATION: 98 % | WEIGHT: 207 LBS | BODY MASS INDEX: 31.47 KG/M2 | DIASTOLIC BLOOD PRESSURE: 78 MMHG

## 2025-08-29 DIAGNOSIS — Z86.39 PERSONAL HISTORY OF OTHER ENDOCRINE, NUTRITIONAL AND METABOLIC DISEASE: ICD-10-CM

## 2025-08-29 DIAGNOSIS — C73 MALIGNANT NEOPLASM OF THYROID GLAND: ICD-10-CM

## 2025-08-29 DIAGNOSIS — E66.811 OBESITY, CLASS 1: ICD-10-CM

## 2025-08-29 DIAGNOSIS — E03.9 HYPOTHYROIDISM, UNSPECIFIED: ICD-10-CM

## 2025-08-29 DIAGNOSIS — E89.2 POSTPROCEDURAL HYPOPARATHYROIDISM: ICD-10-CM

## 2025-08-29 LAB
T4 FREE SERPL-MCNC: 1.6 NG/DL
THYROGLOB AB SERPL-ACNC: 18.1 IU/ML
THYROGLOB SERPL-MCNC: <0.1 NG/ML
TSH SERPL-ACNC: 2.43 UIU/ML

## 2025-08-29 PROCEDURE — 99215 OFFICE O/P EST HI 40 MIN: CPT

## 2025-08-29 PROCEDURE — G2211 COMPLEX E/M VISIT ADD ON: CPT | Mod: NC

## 2025-08-29 RX ORDER — TIRZEPATIDE 2.5 MG/.5ML
2.5 INJECTION, SOLUTION SUBCUTANEOUS
Qty: 1 | Refills: 1 | Status: ACTIVE | COMMUNITY
Start: 2025-08-29 | End: 1900-01-01

## 2025-09-02 ENCOUNTER — APPOINTMENT (OUTPATIENT)
Dept: DERMATOLOGY | Facility: CLINIC | Age: 35
End: 2025-09-02

## 2025-09-10 ENCOUNTER — APPOINTMENT (OUTPATIENT)
Dept: ULTRASOUND IMAGING | Facility: CLINIC | Age: 35
End: 2025-09-10

## (undated) DEVICE — DRAPE 3/4 SHEET 52X76"

## (undated) DEVICE — DRAPE MAGNETIC INSTRUMENT MEDIUM

## (undated) DEVICE — SUT ETHILON 6-0 18" PS-3

## (undated) DEVICE — STAPLER SKIN VISI-STAT 35 WIDE

## (undated) DEVICE — DRAPE TOWEL BLUE 17" X 24"

## (undated) DEVICE — DRSG BENZOIN 0.6CC

## (undated) DEVICE — DRSG STERISTRIPS 0.25 X 3"

## (undated) DEVICE — PREP BETADINE SPONGE STICKS

## (undated) DEVICE — ELCTR GROUNDING PAD ADULT COVIDIEN

## (undated) DEVICE — SUT SILK 2-0 30" SH

## (undated) DEVICE — DRAPE SPLIT SHEET 77" X 120"

## (undated) DEVICE — ELCTR MONOPOLAR STIMULATOR PROBE FLUSH-TIP

## (undated) DEVICE — SUT SILK 3-0 18" TIES

## (undated) DEVICE — DRSG CURITY GAUZE SPONGE 4 X 4" 12-PLY

## (undated) DEVICE — DRAIN JACKSON PRATT 7FR ROUND END NO TROCAR

## (undated) DEVICE — DRAIN JACKSON PRATT 7MM FLAT FULL NO TROCAR

## (undated) DEVICE — PACK HEAD & NECK

## (undated) DEVICE — CANISTER DISPOSABLE THIN WALL 3000CC

## (undated) DEVICE — SUT SILK 2-0 18" FS

## (undated) DEVICE — VENODYNE/SCD SLEEVE CALF MEDIUM

## (undated) DEVICE — SOL IRR POUR H2O 500ML

## (undated) DEVICE — LABELS BLANK W PEN

## (undated) DEVICE — GLV 7 PROTEXIS (WHITE)

## (undated) DEVICE — SUT SILK 2-0 18" TIES

## (undated) DEVICE — SUT VICRYL 6-0 18" P-3 UNDYED

## (undated) DEVICE — SUT VICRYL 4-0 27" RB-1 UNDYED

## (undated) DEVICE — POSITIONER FOAM EGG CRATE ULNAR 2PCS (PINK)

## (undated) DEVICE — DRAIN PENROSE 1" X 12" SILICONE

## (undated) DEVICE — ELCTR BOVIE PENCIL SMOKE EVACUATION

## (undated) DEVICE — DRAPE FLUID WARMER 44 X 44"

## (undated) DEVICE — PROTECTOR HEEL / ELBOW FLUFFY

## (undated) DEVICE — DRAIN RESERVOIR FOR JACKSON PRATT 100CC CARDINAL